# Patient Record
Sex: FEMALE | Race: WHITE | NOT HISPANIC OR LATINO | Employment: UNEMPLOYED | URBAN - METROPOLITAN AREA
[De-identification: names, ages, dates, MRNs, and addresses within clinical notes are randomized per-mention and may not be internally consistent; named-entity substitution may affect disease eponyms.]

---

## 2020-06-18 ENCOUNTER — APPOINTMENT (OUTPATIENT)
Dept: RADIOLOGY | Facility: MEDICAL CENTER | Age: 25
End: 2020-06-18
Attending: EMERGENCY MEDICINE
Payer: MEDICAID

## 2020-06-18 ENCOUNTER — HOSPITAL ENCOUNTER (EMERGENCY)
Facility: MEDICAL CENTER | Age: 25
End: 2020-06-18
Attending: EMERGENCY MEDICINE
Payer: MEDICAID

## 2020-06-18 VITALS
WEIGHT: 160.94 LBS | DIASTOLIC BLOOD PRESSURE: 85 MMHG | TEMPERATURE: 98 F | OXYGEN SATURATION: 98 % | SYSTOLIC BLOOD PRESSURE: 122 MMHG | BODY MASS INDEX: 25.26 KG/M2 | HEART RATE: 70 BPM | RESPIRATION RATE: 16 BRPM | HEIGHT: 67 IN

## 2020-06-18 DIAGNOSIS — S83.402A SPRAIN OF COLLATERAL LIGAMENT OF LEFT KNEE, INITIAL ENCOUNTER: ICD-10-CM

## 2020-06-18 PROCEDURE — 73562 X-RAY EXAM OF KNEE 3: CPT | Mod: LT

## 2020-06-18 PROCEDURE — A9270 NON-COVERED ITEM OR SERVICE: HCPCS | Performed by: EMERGENCY MEDICINE

## 2020-06-18 PROCEDURE — 700102 HCHG RX REV CODE 250 W/ 637 OVERRIDE(OP): Performed by: EMERGENCY MEDICINE

## 2020-06-18 PROCEDURE — 99284 EMERGENCY DEPT VISIT MOD MDM: CPT

## 2020-06-18 RX ORDER — HYDROCODONE BITARTRATE AND ACETAMINOPHEN 5; 325 MG/1; MG/1
2 TABLET ORAL ONCE
Status: COMPLETED | OUTPATIENT
Start: 2020-06-18 | End: 2020-06-18

## 2020-06-18 RX ORDER — IBUPROFEN 800 MG/1
800 TABLET ORAL EVERY 8 HOURS PRN
Qty: 30 TAB | Refills: 0 | Status: SHIPPED | OUTPATIENT
Start: 2020-06-18 | End: 2022-07-05

## 2020-06-18 RX ADMIN — HYDROCODONE BITARTRATE AND ACETAMINOPHEN 2 TABLET: 5; 325 TABLET ORAL at 21:16

## 2020-06-19 NOTE — ED PROVIDER NOTES
ED Provider Note    CHIEF COMPLAINT  Chief Complaint   Patient presents with   • Knee Pain     L Knee, medial.        HPI  Sara Jovel is a 24 y.o. female who presents for evaluation of acute left knee pain.  The patient reports that several weeks ago the medial aspect of her left knee started hurting.  She cannot distinctly recall any lifting twisting or direct blunt or penetrating trauma.  She has had intermittent joint problems due to softball injuries in the past.  She denies any calf swelling fevers chills.  No other symptoms reported.  Patient reports 8 out of 10 pain with extension and ambulation    REVIEW OF SYSTEMS  See HPI for further details.  No fevers chills rash s all other systems are negative.     PAST MEDICAL HISTORY  Past Medical History:   Diagnosis Date   • Anxiety        FAMILY HISTORY  Noncontributory  SOCIAL HISTORY  Social History     Socioeconomic History   • Marital status: Single     Spouse name: Not on file   • Number of children: Not on file   • Years of education: Not on file   • Highest education level: Not on file   Occupational History   • Not on file   Social Needs   • Financial resource strain: Not on file   • Food insecurity     Worry: Not on file     Inability: Not on file   • Transportation needs     Medical: Not on file     Non-medical: Not on file   Tobacco Use   • Smoking status: Never Smoker   • Smokeless tobacco: Never Used   Substance and Sexual Activity   • Alcohol use: No   • Drug use: No   • Sexual activity: Not on file   Lifestyle   • Physical activity     Days per week: Not on file     Minutes per session: Not on file   • Stress: Not on file   Relationships   • Social connections     Talks on phone: Not on file     Gets together: Not on file     Attends [employer] service: Not on file     Active member of club or organization: Not on file     Attends meetings of clubs or organizations: Not on file     Relationship status: Not on file   • Intimate partner violence     " Fear of current or ex partner: Not on file     Emotionally abused: Not on file     Physically abused: Not on file     Forced sexual activity: Not on file   Other Topics Concern   • Not on file   Social History Narrative   • Not on file     Denies IV drugs  SURGICAL HISTORY  Past Surgical History:   Procedure Laterality Date   • OTHER ORTHOPEDIC SURGERY      BL wrist fx; BL ankle fx   • TONSILLECTOMY         CURRENT MEDICATIONS  Home Medications     Reviewed by Schuyler B Collett, R.N. (Registered Nurse) on 06/18/20 at 1859  Med List Status: <None>   Medication Last Dose Status   ALBUTEROL INH  Active   azithromycin (ZITHROMAX) 250 MG TABS  Active   Diazepam (VALIUM PO)  Active   hydrocodone-acetaminophen (VICODIN) 5-500 MG TABS  Active   promethazine (PHENERGAN) 25 MG TABS  Active                ALLERGIES  Allergies   Allergen Reactions   • Nkda [No Known Drug Allergy]        PHYSICAL EXAM  VITAL SIGNS: /83   Pulse 66   Temp 36.8 °C (98.3 °F) (Temporal)   Resp 18   Ht 1.702 m (5' 7\")   Wt 73 kg (160 lb 15 oz)   SpO2 99%   BMI 25.21 kg/m²       Constitutional: Well developed, Well nourished, No acute distress, Non-toxic appearance.   HENT: Normocephalic, Atraumatic, Bilateral external ears normal, Oropharynx moist, No oral exudates, Nose normal.   Eyes: PERRLA, EOMI, Conjunctiva normal, No discharge.   Neck: Normal range of motion, No tenderness, Supple, No stridor.   Cardiovascular: Normal heart rate, Normal rhythm, No murmurs, No rubs, No gallops.   Thorax & Lungs: Normal breath sounds, No respiratory distress, No wheezing, No chest tenderness.   Abdomen: Bowel sounds normal, Soft, No tenderness, No masses, No pulsatile masses.   Skin: Warm, Dry, No erythema, No rash.   Back: No tenderness, No CVA tenderness.   Extremities: There is bony tenderness and discomfort on the medial aspect of the knee without effusion.  Patella is normal.  No erythema or warmth   neurologic: Alert & oriented x 3, Normal " motor function, Normal sensory function, No focal deficits noted.   Psychiatric: Anxious    DX-KNEE 3 VIEWS LEFT   Final Result         1.  No acute traumatic bony injury.            COURSE & MEDICAL DECISION MAKING  Pertinent Labs & Imaging studies reviewed. (See chart for details)  Patient likely has an underlying medial meniscal injury based on her history and physical exam.  We will place her on a knee immobilizer and crutches.  I will prescribe high-dose NSAIDs and refer her for outpatient follow-up with orthopedics with Dr. Jean    FINAL IMPRESSION  1.  Left knee sprain         Electronically signed by: Rome Salamanca M.D., 6/18/2020 7:38 PM

## 2020-06-19 NOTE — ED NOTES
Pt discharged home. Explained discharge and medication instructions. Questions and comments addressed. Pt verbalized understanding of instructions. Pt advised to follow-up with Orthopaedics or return to ED for any new or worsening of symptoms. Pt is ambulating well and steady on feet with use of crutchees. VS stable. Pt advise no driving after taking narcotic medications, pt's spouse in ED lobby and will be driving pt home.

## 2020-06-19 NOTE — ED NOTES
ED tech at bedside now for application of immobilzer to LEFT knee. Pt given crutches fit to height and instructed on proper use, pt verbalized understanding.

## 2020-06-19 NOTE — ED TRIAGE NOTES
Sara Jovel presents to triage, wearing a mask, reporting:  Chief Complaint   Patient presents with   • Knee Pain     L Knee, medial.      Unable to sleep, function. Able to partially weight bear.    Pt denies any respiratory or flu like symptoms at this time. Pt denies any recent travel outside the region. Pt denies exposure to COVID positive individuals.   Pt speaking in full sentences, NAD noted. Pt educated of triage process, placed back in waiting area pending room assignment.

## 2021-10-01 ENCOUNTER — HOSPITAL ENCOUNTER (EMERGENCY)
Facility: MEDICAL CENTER | Age: 26
End: 2021-10-01
Attending: EMERGENCY MEDICINE
Payer: MEDICAID

## 2021-10-01 ENCOUNTER — APPOINTMENT (OUTPATIENT)
Dept: RADIOLOGY | Facility: MEDICAL CENTER | Age: 26
End: 2021-10-01
Attending: EMERGENCY MEDICINE
Payer: MEDICAID

## 2021-10-01 ENCOUNTER — APPOINTMENT (OUTPATIENT)
Dept: URGENT CARE | Facility: CLINIC | Age: 26
End: 2021-10-01
Payer: MEDICAID

## 2021-10-01 VITALS
SYSTOLIC BLOOD PRESSURE: 106 MMHG | RESPIRATION RATE: 18 BRPM | TEMPERATURE: 99 F | HEART RATE: 67 BPM | DIASTOLIC BLOOD PRESSURE: 74 MMHG | WEIGHT: 150.35 LBS | HEIGHT: 67 IN | OXYGEN SATURATION: 97 % | BODY MASS INDEX: 23.6 KG/M2

## 2021-10-01 DIAGNOSIS — R10.32 LEFT LOWER QUADRANT ABDOMINAL PAIN: ICD-10-CM

## 2021-10-01 DIAGNOSIS — N83.202 CYST OF LEFT OVARY: ICD-10-CM

## 2021-10-01 LAB
ALBUMIN SERPL BCP-MCNC: 4.1 G/DL (ref 3.2–4.9)
ALBUMIN/GLOB SERPL: 1.3 G/DL
ALP SERPL-CCNC: 72 U/L (ref 30–99)
ALT SERPL-CCNC: 21 U/L (ref 2–50)
ANION GAP SERPL CALC-SCNC: 10 MMOL/L (ref 7–16)
APPEARANCE UR: ABNORMAL
AST SERPL-CCNC: 25 U/L (ref 12–45)
BACTERIA #/AREA URNS HPF: ABNORMAL /HPF
BASOPHILS # BLD AUTO: 0.7 % (ref 0–1.8)
BASOPHILS # BLD: 0.07 K/UL (ref 0–0.12)
BILIRUB SERPL-MCNC: 0.3 MG/DL (ref 0.1–1.5)
BILIRUB UR QL STRIP.AUTO: NEGATIVE
BUN SERPL-MCNC: 11 MG/DL (ref 8–22)
CALCIUM SERPL-MCNC: 9.1 MG/DL (ref 8.5–10.5)
CHLORIDE SERPL-SCNC: 104 MMOL/L (ref 96–112)
CO2 SERPL-SCNC: 23 MMOL/L (ref 20–33)
COLOR UR: YELLOW
CREAT SERPL-MCNC: 0.88 MG/DL (ref 0.5–1.4)
EOSINOPHIL # BLD AUTO: 0.55 K/UL (ref 0–0.51)
EOSINOPHIL NFR BLD: 5.7 % (ref 0–6.9)
EPI CELLS #/AREA URNS HPF: ABNORMAL /HPF
ERYTHROCYTE [DISTWIDTH] IN BLOOD BY AUTOMATED COUNT: 50.2 FL (ref 35.9–50)
GLOBULIN SER CALC-MCNC: 3.2 G/DL (ref 1.9–3.5)
GLUCOSE SERPL-MCNC: 74 MG/DL (ref 65–99)
GLUCOSE UR STRIP.AUTO-MCNC: NEGATIVE MG/DL
HCG SERPL QL: NEGATIVE
HCT VFR BLD AUTO: 41.4 % (ref 37–47)
HGB BLD-MCNC: 13.3 G/DL (ref 12–16)
HYALINE CASTS #/AREA URNS LPF: ABNORMAL /LPF
IMM GRANULOCYTES # BLD AUTO: 0.04 K/UL (ref 0–0.11)
IMM GRANULOCYTES NFR BLD AUTO: 0.4 % (ref 0–0.9)
KETONES UR STRIP.AUTO-MCNC: NEGATIVE MG/DL
LEUKOCYTE ESTERASE UR QL STRIP.AUTO: ABNORMAL
LIPASE SERPL-CCNC: 30 U/L (ref 11–82)
LYMPHOCYTES # BLD AUTO: 2.71 K/UL (ref 1–4.8)
LYMPHOCYTES NFR BLD: 27.9 % (ref 22–41)
MCH RBC QN AUTO: 26.1 PG (ref 27–33)
MCHC RBC AUTO-ENTMCNC: 32.1 G/DL (ref 33.6–35)
MCV RBC AUTO: 81.2 FL (ref 81.4–97.8)
MICRO URNS: ABNORMAL
MONOCYTES # BLD AUTO: 0.73 K/UL (ref 0–0.85)
MONOCYTES NFR BLD AUTO: 7.5 % (ref 0–13.4)
NEUTROPHILS # BLD AUTO: 5.62 K/UL (ref 2–7.15)
NEUTROPHILS NFR BLD: 57.8 % (ref 44–72)
NITRITE UR QL STRIP.AUTO: NEGATIVE
NRBC # BLD AUTO: 0 K/UL
NRBC BLD-RTO: 0 /100 WBC
PH UR STRIP.AUTO: 7 [PH] (ref 5–8)
PLATELET # BLD AUTO: 243 K/UL (ref 164–446)
PMV BLD AUTO: 9.5 FL (ref 9–12.9)
POTASSIUM SERPL-SCNC: 4.1 MMOL/L (ref 3.6–5.5)
PROT SERPL-MCNC: 7.3 G/DL (ref 6–8.2)
PROT UR QL STRIP: NEGATIVE MG/DL
RBC # BLD AUTO: 5.1 M/UL (ref 4.2–5.4)
RBC # URNS HPF: ABNORMAL /HPF
RBC UR QL AUTO: NEGATIVE
SODIUM SERPL-SCNC: 137 MMOL/L (ref 135–145)
SP GR UR STRIP.AUTO: 1.02
UROBILINOGEN UR STRIP.AUTO-MCNC: 0.2 MG/DL
WBC # BLD AUTO: 9.7 K/UL (ref 4.8–10.8)
WBC #/AREA URNS HPF: ABNORMAL /HPF

## 2021-10-01 PROCEDURE — 87086 URINE CULTURE/COLONY COUNT: CPT

## 2021-10-01 PROCEDURE — 81001 URINALYSIS AUTO W/SCOPE: CPT

## 2021-10-01 PROCEDURE — 83690 ASSAY OF LIPASE: CPT

## 2021-10-01 PROCEDURE — 84703 CHORIONIC GONADOTROPIN ASSAY: CPT

## 2021-10-01 PROCEDURE — 76856 US EXAM PELVIC COMPLETE: CPT

## 2021-10-01 PROCEDURE — 700102 HCHG RX REV CODE 250 W/ 637 OVERRIDE(OP): Performed by: EMERGENCY MEDICINE

## 2021-10-01 PROCEDURE — 99285 EMERGENCY DEPT VISIT HI MDM: CPT | Mod: EDC

## 2021-10-01 PROCEDURE — A9270 NON-COVERED ITEM OR SERVICE: HCPCS | Performed by: EMERGENCY MEDICINE

## 2021-10-01 PROCEDURE — 96374 THER/PROPH/DIAG INJ IV PUSH: CPT | Mod: EDC,XU

## 2021-10-01 PROCEDURE — 74177 CT ABD & PELVIS W/CONTRAST: CPT

## 2021-10-01 PROCEDURE — 96375 TX/PRO/DX INJ NEW DRUG ADDON: CPT | Mod: EDC

## 2021-10-01 PROCEDURE — 85025 COMPLETE CBC W/AUTO DIFF WBC: CPT

## 2021-10-01 PROCEDURE — 80053 COMPREHEN METABOLIC PANEL: CPT

## 2021-10-01 PROCEDURE — 700117 HCHG RX CONTRAST REV CODE 255: Performed by: EMERGENCY MEDICINE

## 2021-10-01 PROCEDURE — 700111 HCHG RX REV CODE 636 W/ 250 OVERRIDE (IP): Performed by: EMERGENCY MEDICINE

## 2021-10-01 RX ORDER — KETOROLAC TROMETHAMINE 30 MG/ML
15 INJECTION, SOLUTION INTRAMUSCULAR; INTRAVENOUS ONCE
Status: COMPLETED | OUTPATIENT
Start: 2021-10-01 | End: 2021-10-01

## 2021-10-01 RX ORDER — HYDROCODONE BITARTRATE AND ACETAMINOPHEN 5; 325 MG/1; MG/1
1 TABLET ORAL ONCE
Status: COMPLETED | OUTPATIENT
Start: 2021-10-01 | End: 2021-10-01

## 2021-10-01 RX ORDER — MORPHINE SULFATE 4 MG/ML
4 INJECTION, SOLUTION INTRAMUSCULAR; INTRAVENOUS ONCE
Status: COMPLETED | OUTPATIENT
Start: 2021-10-01 | End: 2021-10-01

## 2021-10-01 RX ADMIN — MORPHINE SULFATE 4 MG: 4 INJECTION INTRAVENOUS at 17:23

## 2021-10-01 RX ADMIN — KETOROLAC TROMETHAMINE 15 MG: 30 INJECTION, SOLUTION INTRAMUSCULAR at 18:08

## 2021-10-01 RX ADMIN — HYDROCODONE BITARTRATE AND ACETAMINOPHEN 1 TABLET: 5; 325 TABLET ORAL at 19:42

## 2021-10-01 RX ADMIN — IOHEXOL 80 ML: 350 INJECTION, SOLUTION INTRAVENOUS at 17:36

## 2021-10-01 ASSESSMENT — PAIN DESCRIPTION - PAIN TYPE: TYPE: ACUTE PAIN

## 2021-10-01 NOTE — ED NOTES
First interaction with patient. Pt ambulatory to room. Assessment completed.   Reviewed and agree with triage note.     Instructed pt on call light and NPO status. Chart up for ERP.

## 2021-10-01 NOTE — ED TRIAGE NOTES
27 y/o female ambulatory to triage with c/o LLQ abd pain x 10 days. Pt states she was seen at [facility] and told to come to the ED for further evaluation. Pt denies n/v/d, no dysuria reported. Pt LMP was 8/19 which is not usual for her, pt reports negative pregnancy test at .

## 2021-10-01 NOTE — ED PROVIDER NOTES
"ED Provider Note    CHIEF COMPLAINT  Chief Complaint   Patient presents with   • LLQ Pain     x 10 days       HPI  Sara Jovel is a 26 y.o. female who presents with LLQ abdominal pain for about 10 days.  She awoke with the symptoms.  No vomiting or diarrhea. No fevers.  No dysuria.  No vaginal bleeding or discharge. Last menstrual cycle was about 5 weeks ago that is not atypical for her.  No chest pain, shortness of breath, cough.      She has a prior hx of appendectomy.  No gyn surgery in the past.  Has chronic back pain.  No known family history of inflammatory bowel disease or other known GI issues.  No prior hx of colonoscopy.      REVIEW OF SYSTEMS  See HPI for further details. All other systems are negative.     PAST MEDICAL HISTORY   has a past medical history of Anxiety.    SOCIAL HISTORY  Social History     Tobacco Use   • Smoking status: Never Smoker   • Smokeless tobacco: Never Used   Vaping Use   • Vaping Use: Every day   • Substances: Nicotine   Substance and Sexual Activity   • Alcohol use: Yes     Comment: socially   • Drug use: Yes     Types: Inhaled     Comment: marijuana   • Sexual activity: Not on file       SURGICAL HISTORY   has a past surgical history that includes tonsillectomy and appendectomy.    CURRENT MEDICATIONS  Home Medications     Reviewed by Bossman Llamas R.N. (Registered Nurse) on 10/01/21 at 1524  Med List Status: Partial   Medication Last Dose Status   ALBUTEROL INH  Active   azithromycin (ZITHROMAX) 250 MG TABS  Active   Diazepam (VALIUM PO)  Active   hydrocodone-acetaminophen (VICODIN) 5-500 MG TABS  Active   ibuprofen (MOTRIN) 800 MG Tab  Active   promethazine (PHENERGAN) 25 MG TABS  Active                ALLERGIES  Allergies   Allergen Reactions   • Nkda [No Known Drug Allergy]        PHYSICAL EXAM  VITAL SIGNS: /83   Pulse 68   Temp 37.1 °C (98.8 °F) (Temporal)   Resp 16   Ht 1.702 m (5' 7\")   Wt 68.2 kg (150 lb 5.7 oz)   LMP 08/19/2021   SpO2 100%   BMI " 23.55 kg/m²   Pulse ox interpretation: I interpret this pulse ox as normal.  Constitutional: Alert in no apparent distress.  HENT: No signs of trauma, Bilateral external ears normal, Nose normal.   Eyes: Conjunctiva normal, Non-icteric.   Neck:  No tenderness, Supple, No stridor.   Cardiovascular: Regular rate and rhythm.   Thorax & Lungs: Normal breath sounds, No respiratory distress  Abdomen: Bowel sounds normal, Soft, LLQ tenderness, No masses, No pulsatile masses. No peritoneal signs.  Skin: Warm, Dry, No erythema, No rash.   Extremities: Intact distal pulses, No edema, No cyanosis  Musculoskeletal: Good range of motion in all major joints. No major deformities noted.   Neurologic: Alert, No focal deficits noted.       DIAGNOSTIC STUDIES / PROCEDURES      LABS  Labs Reviewed   CBC WITH DIFFERENTIAL - Abnormal; Notable for the following components:       Result Value    MCV 81.2 (*)     MCH 26.1 (*)     MCHC 32.1 (*)     RDW 50.2 (*)     Eos (Absolute) 0.55 (*)     All other components within normal limits   URINALYSIS,CULTURE IF INDICATED - Abnormal; Notable for the following components:    Character Cloudy (*)     Leukocyte Esterase Small (*)     All other components within normal limits    Narrative:     Indication for culture:->Patient WITHOUT an indwelling Jordan  catheter in place with new onset of Dysuria, Frequency,  Urgency, and/or Suprapubic pain   URINE MICROSCOPIC (W/UA) - Abnormal; Notable for the following components:    WBC 10-20 (*)     Bacteria Few (*)     Epithelial Cells Moderate (*)     Hyaline Cast 3-5 (*)     All other components within normal limits    Narrative:     Indication for culture:->Patient WITHOUT an indwelling Jordan  catheter in place with new onset of Dysuria, Frequency,  Urgency, and/or Suprapubic pain   COMP METABOLIC PANEL   LIPASE   HCG QUAL SERUM   URINE CULTURE(NEW)    Narrative:     Indication for culture:->Patient WITHOUT an indwelling Jordan  catheter in place with new  onset of Dysuria, Frequency,  Urgency, and/or Suprapubic pain         RADIOLOGY  US-PELVIC COMPLETE (TRANSABDOMINAL/TRANSVAGINAL) (COMBO)   Final Result      1.  Endometrial complex thickening. Correlation with menstrual status is recommended.      2.  Small amount of free fluid in the cul-de-sac.      3.  Otherwise negative pelvic ultrasound.      CT-ABDOMEN-PELVIS WITH   Final Result      1.  There is no acute inflammatory process in the abdomen or pelvis.   2.  There is no bowel obstruction.   3.  There is a probable crenated resolving cyst in the left hemipelvis.   4.  There is an incidental 4 mm left lower lobe lung nodule, statistically most likely postinflammatory in a patient of this age. This patient is below the age limit for utilizing Fleischner criteria. No imaging follow-up is recommended in this age    group.            COURSE & MEDICAL DECISION MAKING    Medications   morphine (pf) 4 mg/mL injection 4 mg (4 mg Intravenous Given 10/1/21 1723)   iohexol (OMNIPAQUE) 350 mg/mL (80 mL Intravenous Given 10/1/21 1736)   ketorolac (TORADOL) injection 15 mg (15 mg Intravenous Given 10/1/21 1808)   HYDROcodone-acetaminophen (NORCO) 5-325 MG per tablet 1 Tablet (1 Tablet Oral Given 10/1/21 1942)       Pertinent Labs & Imaging studies reviewed. (See chart for details)  26 y.o. female presenting with left lower quadrant abdominal pain. Has been ongoing for about 10 days. No vaginal bleeding or discharge. No fevers. She states that her last menstrual cycle was a little bit over a month ago which is not completely out of the ordinary for her. Has a prior history of appendectomy. She does have left lower quadrant abdominal pain on physical examination. No GI symptoms such as bloody stool or black stool or diarrhea. No prior history of colonoscopy.    CT abdomen pelvis was ordered for further evaluation of patient's symptoms. No evidence of diverticulitis. She was found to have a commuted resolving cyst to the left  "hemipelvis.    Patient was informed regarding the results. Continues to have pain despite IV analgesia. Ultrasound of pelvis was unremarkable. No evidence of ovarian torsion.    Pain may be related to ruptured cyst or possibly related to menstrual cycle pain since she is slightly delayed. She is not currently pregnant. Does not appear to have an acute inflammatory process or infectious etiology. Recommending supportive care measures at home such as acetaminophen or ibuprofen. Patient was ultimately discharged home in stable condition.    The patient was instructed to follow-up with primary care physician for further management.  To return immediately for any worsening symptoms or development of any other concerning signs or symptoms. The patient verbalizes understanding in their own words.    /74   Pulse 67   Temp 37.2 °C (99 °F) (Temporal)   Resp 18   Ht 1.702 m (5' 7\")   Wt 68.2 kg (150 lb 5.7 oz)   LMP 08/19/2021   SpO2 97%   BMI 23.55 kg/m²     The patient was referred to primary care where they will receive further BP management.      Rito Mesa M.D.  [address] [place] [id number]  [phone]    Schedule an appointment as soon as possible for a visit       [facility], Emergency Dept  [address] [place] [id number] [phone]    As needed, If symptoms worsen      FINAL IMPRESSION  1. Left lower quadrant abdominal pain    2. Cyst of left ovary            Electronically signed by: Minh Snider M.D., 10/1/2021 4:29 PM    "

## 2021-10-02 NOTE — ED NOTES
"Sara Jovel has been discharged from the Emergency Room.    Discharge instructions, which include signs and symptoms to monitor patient for, hydration and hand hygiene importance, as well as detailed information regarding LLQ abdominal pain, cyst of L ovary provided. This RN also encouraged a follow-up appointment to be made with patient's PCP. All questions and concerns addressed at this time.      Patient leaves ER in no apparent distress, is awake, alert, and interactive. Patient is aware of the need to return to the ER for any concerns or changes in current condition.    /74   Pulse 67   Temp 37.2 °C (99 °F) (Temporal)   Resp 18   Ht 1.702 m (5' 7\")   Wt 68.2 kg (150 lb 5.7 oz)   LMP 08/19/2021   SpO2 97%   BMI 23.55 kg/m²         "

## 2021-10-03 LAB
BACTERIA UR CULT: NORMAL
SIGNIFICANT IND 70042: NORMAL
SITE SITE: NORMAL
SOURCE SOURCE: NORMAL

## 2022-03-17 ENCOUNTER — NON-PROVIDER VISIT (OUTPATIENT)
Dept: URGENT CARE | Facility: CLINIC | Age: 27
End: 2022-03-17

## 2022-03-17 DIAGNOSIS — Z11.1 PPD SCREENING TEST: ICD-10-CM

## 2022-03-17 PROCEDURE — 86580 TB INTRADERMAL TEST: CPT | Performed by: PHYSICIAN ASSISTANT

## 2022-03-17 PROCEDURE — 99999 PR NO CHARGE: CPT | Performed by: PHYSICIAN ASSISTANT

## 2022-03-17 NOTE — NON-PROVIDER
Sara Jovel is a 26 y.o. female here for a non-provider visit for PPD placement -- Step 1 of 1    Reason for PPD:  work requirement    1. TB evaluation questionnaire completed by patient? Yes      -  If any answers marked yes did you contact a provider prior to placing? Not Indicated  2.  Patient notified to return to clinic for reading on: Saturday, 03/19/22 after 0900 or Sunday, 03/20/22 before 0900.  3.  PPD Placement documentation completed on TB evaluation questionnaire? Yes  4.  Location of TB evaluation questionnaire filed: .

## 2022-03-19 ENCOUNTER — NON-PROVIDER VISIT (OUTPATIENT)
Dept: URGENT CARE | Facility: CLINIC | Age: 27
End: 2022-03-19

## 2022-03-19 LAB — TB WHEAL 3D P 5 TU DIAM: NORMAL MM

## 2022-03-19 PROCEDURE — 99999 PR NO CHARGE: CPT | Performed by: NURSE PRACTITIONER

## 2022-05-11 ENCOUNTER — APPOINTMENT (OUTPATIENT)
Dept: RADIOLOGY | Facility: MEDICAL CENTER | Age: 27
End: 2022-05-11
Attending: EMERGENCY MEDICINE
Payer: MEDICAID

## 2022-05-11 ENCOUNTER — HOSPITAL ENCOUNTER (EMERGENCY)
Facility: MEDICAL CENTER | Age: 27
End: 2022-05-11
Attending: EMERGENCY MEDICINE
Payer: MEDICAID

## 2022-05-11 VITALS
HEIGHT: 67 IN | OXYGEN SATURATION: 94 % | WEIGHT: 207.89 LBS | HEART RATE: 82 BPM | SYSTOLIC BLOOD PRESSURE: 106 MMHG | TEMPERATURE: 97.9 F | DIASTOLIC BLOOD PRESSURE: 61 MMHG | BODY MASS INDEX: 32.63 KG/M2 | RESPIRATION RATE: 16 BRPM

## 2022-05-11 DIAGNOSIS — G43.109 MIGRAINE WITH AURA AND WITHOUT STATUS MIGRAINOSUS, NOT INTRACTABLE: ICD-10-CM

## 2022-05-11 LAB
ALBUMIN SERPL BCP-MCNC: 3.8 G/DL (ref 3.2–4.9)
ALBUMIN/GLOB SERPL: 1.5 G/DL
ALP SERPL-CCNC: 71 U/L (ref 30–99)
ALT SERPL-CCNC: 14 U/L (ref 2–50)
ANION GAP SERPL CALC-SCNC: 12 MMOL/L (ref 7–16)
AST SERPL-CCNC: 19 U/L (ref 12–45)
BASOPHILS # BLD AUTO: 1 % (ref 0–1.8)
BASOPHILS # BLD: 0.07 K/UL (ref 0–0.12)
BILIRUB SERPL-MCNC: <0.2 MG/DL (ref 0.1–1.5)
BUN SERPL-MCNC: 9 MG/DL (ref 8–22)
CALCIUM SERPL-MCNC: 8.7 MG/DL (ref 8.5–10.5)
CHLORIDE SERPL-SCNC: 108 MMOL/L (ref 96–112)
CO2 SERPL-SCNC: 20 MMOL/L (ref 20–33)
CREAT SERPL-MCNC: 0.65 MG/DL (ref 0.5–1.4)
EOSINOPHIL # BLD AUTO: 0.62 K/UL (ref 0–0.51)
EOSINOPHIL NFR BLD: 8.4 % (ref 0–6.9)
ERYTHROCYTE [DISTWIDTH] IN BLOOD BY AUTOMATED COUNT: 45.4 FL (ref 35.9–50)
GFR SERPLBLD CREATININE-BSD FMLA CKD-EPI: 125 ML/MIN/1.73 M 2
GLOBULIN SER CALC-MCNC: 2.5 G/DL (ref 1.9–3.5)
GLUCOSE SERPL-MCNC: 84 MG/DL (ref 65–99)
HCG SERPL QL: NEGATIVE
HCT VFR BLD AUTO: 38.4 % (ref 37–47)
HGB BLD-MCNC: 12.5 G/DL (ref 12–16)
IMM GRANULOCYTES # BLD AUTO: 0.05 K/UL (ref 0–0.11)
IMM GRANULOCYTES NFR BLD AUTO: 0.7 % (ref 0–0.9)
LYMPHOCYTES # BLD AUTO: 2.31 K/UL (ref 1–4.8)
LYMPHOCYTES NFR BLD: 31.5 % (ref 22–41)
MCH RBC QN AUTO: 27.2 PG (ref 27–33)
MCHC RBC AUTO-ENTMCNC: 32.6 G/DL (ref 33.6–35)
MCV RBC AUTO: 83.5 FL (ref 81.4–97.8)
MONOCYTES # BLD AUTO: 0.44 K/UL (ref 0–0.85)
MONOCYTES NFR BLD AUTO: 6 % (ref 0–13.4)
NEUTROPHILS # BLD AUTO: 3.85 K/UL (ref 2–7.15)
NEUTROPHILS NFR BLD: 52.4 % (ref 44–72)
NRBC # BLD AUTO: 0 K/UL
NRBC BLD-RTO: 0 /100 WBC
PLATELET # BLD AUTO: 271 K/UL (ref 164–446)
PMV BLD AUTO: 9 FL (ref 9–12.9)
POTASSIUM SERPL-SCNC: 3.9 MMOL/L (ref 3.6–5.5)
PROT SERPL-MCNC: 6.3 G/DL (ref 6–8.2)
RBC # BLD AUTO: 4.6 M/UL (ref 4.2–5.4)
SODIUM SERPL-SCNC: 140 MMOL/L (ref 135–145)
WBC # BLD AUTO: 7.3 K/UL (ref 4.8–10.8)

## 2022-05-11 PROCEDURE — 96375 TX/PRO/DX INJ NEW DRUG ADDON: CPT

## 2022-05-11 PROCEDURE — 99284 EMERGENCY DEPT VISIT MOD MDM: CPT

## 2022-05-11 PROCEDURE — 84703 CHORIONIC GONADOTROPIN ASSAY: CPT

## 2022-05-11 PROCEDURE — 36415 COLL VENOUS BLD VENIPUNCTURE: CPT

## 2022-05-11 PROCEDURE — 700102 HCHG RX REV CODE 250 W/ 637 OVERRIDE(OP): Performed by: EMERGENCY MEDICINE

## 2022-05-11 PROCEDURE — 85025 COMPLETE CBC W/AUTO DIFF WBC: CPT

## 2022-05-11 PROCEDURE — A9270 NON-COVERED ITEM OR SERVICE: HCPCS | Performed by: EMERGENCY MEDICINE

## 2022-05-11 PROCEDURE — 96374 THER/PROPH/DIAG INJ IV PUSH: CPT

## 2022-05-11 PROCEDURE — 80053 COMPREHEN METABOLIC PANEL: CPT

## 2022-05-11 PROCEDURE — 70450 CT HEAD/BRAIN W/O DYE: CPT

## 2022-05-11 PROCEDURE — 700105 HCHG RX REV CODE 258: Performed by: EMERGENCY MEDICINE

## 2022-05-11 PROCEDURE — 700111 HCHG RX REV CODE 636 W/ 250 OVERRIDE (IP): Performed by: EMERGENCY MEDICINE

## 2022-05-11 RX ORDER — PROCHLORPERAZINE EDISYLATE 5 MG/ML
10 INJECTION INTRAMUSCULAR; INTRAVENOUS ONCE
Status: COMPLETED | OUTPATIENT
Start: 2022-05-11 | End: 2022-05-11

## 2022-05-11 RX ORDER — ACETAMINOPHEN 325 MG/1
650 TABLET ORAL ONCE
Status: COMPLETED | OUTPATIENT
Start: 2022-05-11 | End: 2022-05-11

## 2022-05-11 RX ORDER — MORPHINE SULFATE 4 MG/ML
4 INJECTION INTRAVENOUS ONCE
Status: COMPLETED | OUTPATIENT
Start: 2022-05-11 | End: 2022-05-11

## 2022-05-11 RX ORDER — SODIUM CHLORIDE, SODIUM LACTATE, POTASSIUM CHLORIDE, CALCIUM CHLORIDE 600; 310; 30; 20 MG/100ML; MG/100ML; MG/100ML; MG/100ML
1000 INJECTION, SOLUTION INTRAVENOUS ONCE
Status: COMPLETED | OUTPATIENT
Start: 2022-05-11 | End: 2022-05-11

## 2022-05-11 RX ORDER — ONDANSETRON 2 MG/ML
4 INJECTION INTRAMUSCULAR; INTRAVENOUS ONCE
Status: COMPLETED | OUTPATIENT
Start: 2022-05-11 | End: 2022-05-11

## 2022-05-11 RX ORDER — DEXAMETHASONE SODIUM PHOSPHATE 4 MG/ML
10 INJECTION, SOLUTION INTRA-ARTICULAR; INTRALESIONAL; INTRAMUSCULAR; INTRAVENOUS; SOFT TISSUE ONCE
Status: COMPLETED | OUTPATIENT
Start: 2022-05-11 | End: 2022-05-11

## 2022-05-11 RX ADMIN — ONDANSETRON 4 MG: 2 INJECTION INTRAMUSCULAR; INTRAVENOUS at 15:31

## 2022-05-11 RX ADMIN — DEXAMETHASONE SODIUM PHOSPHATE 10 MG: 4 INJECTION, SOLUTION INTRA-ARTICULAR; INTRALESIONAL; INTRAMUSCULAR; INTRAVENOUS; SOFT TISSUE at 18:09

## 2022-05-11 RX ADMIN — ACETAMINOPHEN 650 MG: 325 TABLET, FILM COATED ORAL at 15:31

## 2022-05-11 RX ADMIN — MORPHINE SULFATE 4 MG: 4 INJECTION INTRAVENOUS at 16:49

## 2022-05-11 RX ADMIN — PROCHLORPERAZINE EDISYLATE 10 MG: 5 INJECTION INTRAMUSCULAR; INTRAVENOUS at 15:31

## 2022-05-11 RX ADMIN — SODIUM CHLORIDE, POTASSIUM CHLORIDE, SODIUM LACTATE AND CALCIUM CHLORIDE 1000 ML: 600; 310; 30; 20 INJECTION, SOLUTION INTRAVENOUS at 15:31

## 2022-05-11 ASSESSMENT — FIBROSIS 4 INDEX: FIB4 SCORE: 0.56

## 2022-05-11 NOTE — ED TRIAGE NOTES
Pt ambulated to triage with   Chief Complaint   Patient presents with   • Head Ache     H/o migraine, started this morning at 630, blurred vision to left eye, sensitive to right eye to light and sensitive to sound.  H/o similar in past, took 3 of excedrin at home and had shot of toradol and zofran  at  this morning.       Pt Informed regarding triage process and verbalized understanding to inform triage tech or RN for any changes in condition. Placed in lobby.

## 2022-05-11 NOTE — ED PROVIDER NOTES
"ED Provider Note    Scribed for Yovani Akbar M.D. by Fallon Liao. 5/11/2022, 2:39 PM.    Primary care provider: PETE Hdez  Means of arrival: Walk-in  History obtained from: Patient  History limited by: None    CHIEF COMPLAINT  Chief Complaint   Patient presents with   • Head Ache     H/o migraine, started this morning at 630, blurred vision to left eye, sensitive to right eye to light and sensitive to sound.  H/o similar in past, took 3 of excedrin at home and had shot of toradol and zofran  at  this morning.         AUDRA Jovel is a 26 y.o. female who presents to the Emergency Department for a gradually worsening headache onset 6:00 AM. She is additionally experiencing blurred vision in her left eye which she describes as a \"cloudiness\", photophobia in the right eye, neck pain, nausea, vomiting, and difficulty word finding onset 9:00 AM. She took 3 Excedrin this morning with no alleviation. She denies numbness, tingling, slurred speech, cough, fevers, and chills. She has a history of headaches which she believed improved after stopping her birth control. She says this headache and accompanying symptoms are not consistent with headaches she has experienced in the past. She is currently menstruating. She denies typically experiencing headaches during her menstrual cycles. She denies any allergies to medications. She was evaluated at Urgent Care prior to arrival and received Toradol and Zofran.    REVIEW OF SYSTEMS  Pertinent positives include headache, blurred vision, photophobia, neck pain, nausea, vomiting, and difficulty word finding. Pertinent negatives include numbness, tingling, slurred speech, cough, fevers, and chills. All other systems negative.    PAST MEDICAL HISTORY   has a past medical history of Anxiety and Migraine.    SURGICAL HISTORY   has a past surgical history that includes tonsillectomy and appendectomy.    SOCIAL HISTORY  Social History     Tobacco Use   • " "Smoking status: Never Smoker   • Smokeless tobacco: Never Used   Vaping Use   • Vaping Use: Every day   • Substances: Nicotine   Substance Use Topics   • Alcohol use: Yes     Comment: socially   • Drug use: Yes     Types: Inhaled     Comment: marijuana      Social History     Substance and Sexual Activity   Drug Use Yes   • Types: Inhaled    Comment: marijuana       FAMILY HISTORY  History reviewed. No pertinent family history.    CURRENT MEDICATIONS  Home Medications     Reviewed by Porsche Bird R.N. (Registered Nurse) on 05/11/22 at 1335  Med List Status: Partial   Medication Last Dose Status   ALBUTEROL INH  Active   azithromycin (ZITHROMAX) 250 MG TABS  Active   Diazepam (VALIUM PO)  Active   hydrocodone-acetaminophen (VICODIN) 5-500 MG TABS  Active   ibuprofen (MOTRIN) 800 MG Tab  Active   promethazine (PHENERGAN) 25 MG TABS  Active                ALLERGIES  Allergies   Allergen Reactions   • Nkda [No Known Drug Allergy]        PHYSICAL EXAM  VITAL SIGNS: BP (!) 147/87   Pulse 90   Temp 36.7 °C (98 °F) (Temporal)   Resp 16   Ht 1.702 m (5' 7\")   Wt 94.3 kg (207 lb 14.3 oz)   LMP 05/10/2022   SpO2 99%   BMI 32.56 kg/m²     Constitutional: Well developed, Well nourished, in mild distress.   HENT: Normocephalic, Atraumatic, mask in place.  Eyes: Conjunctiva normal, No discharge. No obvious hemorrhage.  Neck: Supple, No stridor, no meningismus.   Cardiovascular: Normal heart rate, Normal rhythm, No murmurs, equal pulses.   Pulmonary: Normal breath sounds, No respiratory distress, No wheezing, No rales, No rhonchi.  Chest: No chest wall tenderness or deformity.   Abdomen:Soft, No tenderness, No masses, no rebound, no guarding.   Back: No CVA tenderness.   Musculoskeletal: No major deformities noted, No tenderness.   Skin: Warm, Dry, No erythema, No rash.   Neurologic: Alert & oriented x 3, Normal motor function, No focal deficits noted. Normal finger to nose, Normal cranial nerves II-XII, No pronator " drift. Equal strength in upper and lower extremities bilaterally.  Psychiatric: Affect normal, Judgment normal, Mood normal.     LABS  Results for orders placed or performed during the hospital encounter of 05/11/22   CBC WITH DIFFERENTIAL   Result Value Ref Range    WBC 7.3 4.8 - 10.8 K/uL    RBC 4.60 4.20 - 5.40 M/uL    Hemoglobin 12.5 12.0 - 16.0 g/dL    Hematocrit 38.4 37.0 - 47.0 %    MCV 83.5 81.4 - 97.8 fL    MCH 27.2 27.0 - 33.0 pg    MCHC 32.6 (L) 33.6 - 35.0 g/dL    RDW 45.4 35.9 - 50.0 fL    Platelet Count 271 164 - 446 K/uL    MPV 9.0 9.0 - 12.9 fL    Neutrophils-Polys 52.40 44.00 - 72.00 %    Lymphocytes 31.50 22.00 - 41.00 %    Monocytes 6.00 0.00 - 13.40 %    Eosinophils 8.40 (H) 0.00 - 6.90 %    Basophils 1.00 0.00 - 1.80 %    Immature Granulocytes 0.70 0.00 - 0.90 %    Nucleated RBC 0.00 /100 WBC    Neutrophils (Absolute) 3.85 2.00 - 7.15 K/uL    Lymphs (Absolute) 2.31 1.00 - 4.80 K/uL    Monos (Absolute) 0.44 0.00 - 0.85 K/uL    Eos (Absolute) 0.62 (H) 0.00 - 0.51 K/uL    Baso (Absolute) 0.07 0.00 - 0.12 K/uL    Immature Granulocytes (abs) 0.05 0.00 - 0.11 K/uL    NRBC (Absolute) 0.00 K/uL   COMP METABOLIC PANEL   Result Value Ref Range    Sodium 140 135 - 145 mmol/L    Potassium 3.9 3.6 - 5.5 mmol/L    Chloride 108 96 - 112 mmol/L    Co2 20 20 - 33 mmol/L    Anion Gap 12.0 7.0 - 16.0    Glucose 84 65 - 99 mg/dL    Bun 9 8 - 22 mg/dL    Creatinine 0.65 0.50 - 1.40 mg/dL    Calcium 8.7 8.5 - 10.5 mg/dL    AST(SGOT) 19 12 - 45 U/L    ALT(SGPT) 14 2 - 50 U/L    Alkaline Phosphatase 71 30 - 99 U/L    Total Bilirubin <0.2 0.1 - 1.5 mg/dL    Albumin 3.8 3.2 - 4.9 g/dL    Total Protein 6.3 6.0 - 8.2 g/dL    Globulin 2.5 1.9 - 3.5 g/dL    A-G Ratio 1.5 g/dL   HCG QUAL SERUM   Result Value Ref Range    Beta-Hcg Qualitative Serum Negative Negative   ESTIMATED GFR   Result Value Ref Range    GFR (CKD-EPI) 125 >60 mL/min/1.73 m 2       All labs reviewed by me.    RADIOLOGY  CT-HEAD W/O   Final Result      No  CT evidence of acute infarct, hemorrhage or mass.           The radiologist's interpretation of all radiological studies have been reviewed by me.    COURSE & MEDICAL DECISION MAKING  Pertinent Labs & Imaging studies reviewed. (See chart for details)    2:39 PM - Patient seen and examined at bedside. Patient will be treated with Compazine 10 mg, Zofran 4 mg, Tylenol 650 mg, and LR infusion (bolus). Ordered CBC with diff, CMP, and HCG Qualitative to evaluate her symptoms. The differential diagnoses include but are not limited to: migraine headache, subarachnoid hemorrhage, tension headache    HYDRATION: Based on the patient's presentation of Inability to take oral fluids the patient was given IV fluids. IV Hydration was used because oral hydration was not adequate alone. Upon recheck following hydration, the patient was improved.    4:37 PM - Patient was reevaluated at bedside. Her headache has not improved. Ordered CT-Head to further evaluate. Treated patient with morphine 4 mg.    5:58 PM - Patient was reevaluated at bedside. Her headache is has almost resolved completley. Discussed lab and radiology results with the patient. I discussed performing a lumbar puncture to rule out hemmhorage and she declined. She understands the risk of permanent disability or death. She is nervous because she has spina bifida.      6:02 PM - Treated patient with Decadron 10 mg prior to discharge.     Medical Decision Making: At this point time I think the patient most likely has a migraine headache causing his vision changes and pain.   initially after treatment with migraine cocktail patient's headache was not significantly improved therefore CT of the head was done to rule out subarachnoid hemorrhage which is negative.  She has no fever, no neck stiffness, no elevation of white blood cell count I do not think the patient has meningitis.  I did offer her lumbar puncture to fully rule out subarachnoid hemorrhage but the patient is  declining this at this point.  She understands the risks including permanent disability and death if we miss a subarachnoid hemorrhage but given the fact that her headache is now improved and that she has spina bifida she does not want to proceed.  We will go ahead and discharge her home to have her follow-up with her primary.     The patient will return for new or worsening symptoms and is stable at the time of discharge.    The patient is referred to a primary physician for blood pressure management, diabetic screening, and for all other preventative health concerns.         DISPOSITION:  Patient will be discharged home in stable condition.    FOLLOW UP:  SHAMIR HdezRMarlyN.  [address] [address]  [phone]    Schedule an appointment as soon as possible for a visit in 1 week        OUTPATIENT MEDICATIONS:  Discharge Medication List as of 5/11/2022  6:03 PM           FINAL IMPRESSION  1. Migraine with aura and without status migrainosus, not intractable          Fallon KELLY (Sheryl), am scribing for, and in the presence of, Yovani Akbar M.D.    Electronically signed by: Fallon Liao (Sheryl), 5/11/2022    Yovani KELLY M.D. personally performed the services described in this documentation, as scribed by Fallon Liao in my presence, and it is both accurate and complete.    The note accurately reflects work and decisions made by me.  Yovani Akbar M.D.  5/11/2022  7:27 PM

## 2022-05-12 NOTE — DISCHARGE INSTRUCTIONS
Return the emergency department if you have new or different headache, neck stiffness, confusion, unilateral weakness or fever.

## 2022-05-25 ENCOUNTER — HOSPITAL ENCOUNTER (EMERGENCY)
Facility: MEDICAL CENTER | Age: 27
End: 2022-05-25
Attending: EMERGENCY MEDICINE
Payer: MEDICAID

## 2022-05-25 VITALS
TEMPERATURE: 97.9 F | BODY MASS INDEX: 33.11 KG/M2 | HEIGHT: 67 IN | RESPIRATION RATE: 16 BRPM | DIASTOLIC BLOOD PRESSURE: 73 MMHG | OXYGEN SATURATION: 97 % | WEIGHT: 210.98 LBS | SYSTOLIC BLOOD PRESSURE: 108 MMHG | HEART RATE: 62 BPM

## 2022-05-25 DIAGNOSIS — Q05.9 SPINA BIFIDA, UNSPECIFIED HYDROCEPHALUS PRESENCE, UNSPECIFIED SPINAL REGION (HCC): ICD-10-CM

## 2022-05-25 DIAGNOSIS — M54.41 ACUTE BILATERAL LOW BACK PAIN WITH RIGHT-SIDED SCIATICA: ICD-10-CM

## 2022-05-25 PROCEDURE — 99284 EMERGENCY DEPT VISIT MOD MDM: CPT

## 2022-05-25 PROCEDURE — 96375 TX/PRO/DX INJ NEW DRUG ADDON: CPT

## 2022-05-25 PROCEDURE — 700105 HCHG RX REV CODE 258: Performed by: EMERGENCY MEDICINE

## 2022-05-25 PROCEDURE — 700101 HCHG RX REV CODE 250: Performed by: EMERGENCY MEDICINE

## 2022-05-25 PROCEDURE — 700111 HCHG RX REV CODE 636 W/ 250 OVERRIDE (IP): Performed by: EMERGENCY MEDICINE

## 2022-05-25 PROCEDURE — 96374 THER/PROPH/DIAG INJ IV PUSH: CPT

## 2022-05-25 RX ORDER — CYCLOBENZAPRINE HCL 10 MG
10 TABLET ORAL 3 TIMES DAILY PRN
Qty: 30 TABLET | Refills: 0 | Status: SHIPPED | OUTPATIENT
Start: 2022-05-25 | End: 2022-07-05

## 2022-05-25 RX ORDER — METHYLPREDNISOLONE 4 MG/1
TABLET ORAL
Qty: 1 EACH | Refills: 0 | Status: SHIPPED | OUTPATIENT
Start: 2022-05-25 | End: 2022-07-05

## 2022-05-25 RX ORDER — KETOROLAC TROMETHAMINE 30 MG/ML
30 INJECTION, SOLUTION INTRAMUSCULAR; INTRAVENOUS ONCE
Status: COMPLETED | OUTPATIENT
Start: 2022-05-25 | End: 2022-05-25

## 2022-05-25 RX ORDER — DEXAMETHASONE SODIUM PHOSPHATE 4 MG/ML
4 INJECTION, SOLUTION INTRA-ARTICULAR; INTRALESIONAL; INTRAMUSCULAR; INTRAVENOUS; SOFT TISSUE ONCE
Status: COMPLETED | OUTPATIENT
Start: 2022-05-25 | End: 2022-05-25

## 2022-05-25 RX ORDER — MIDAZOLAM HYDROCHLORIDE 1 MG/ML
0.5 INJECTION INTRAMUSCULAR; INTRAVENOUS ONCE
Status: COMPLETED | OUTPATIENT
Start: 2022-05-25 | End: 2022-05-25

## 2022-05-25 RX ADMIN — MIDAZOLAM HYDROCHLORIDE 0.5 MG: 1 INJECTION, SOLUTION INTRAMUSCULAR; INTRAVENOUS at 13:24

## 2022-05-25 RX ADMIN — DEXAMETHASONE SODIUM PHOSPHATE 4 MG: 4 INJECTION, SOLUTION INTRA-ARTICULAR; INTRALESIONAL; INTRAMUSCULAR; INTRAVENOUS; SOFT TISSUE at 13:24

## 2022-05-25 RX ADMIN — KETAMINE HYDROCHLORIDE 25 MG: 10 INJECTION INTRAMUSCULAR; INTRAVENOUS at 13:27

## 2022-05-25 RX ADMIN — KETOROLAC TROMETHAMINE 30 MG: 30 INJECTION, SOLUTION INTRAMUSCULAR; INTRAVENOUS at 13:24

## 2022-05-25 ASSESSMENT — FIBROSIS 4 INDEX: FIB4 SCORE: 0.47

## 2022-05-25 NOTE — ED TRIAGE NOTES
Sara Jovel  26 y.o. female  Chief Complaint   Patient presents with   • Back Pain     Hx spina bifida. Saw MD a few days ago. Xray ordered a few weeks from now. Unable to sleep due to pain. Pain is worst when lifting leg to take a step.    • Sent by MD     Due to spine disorder and 10/10 pain       Pt ambulatory to triage with steady slow gait for above complaint.     Pt is alert and oriented, speaking in full sentences, follows commands and responds appropriately to questions. Resp are even and unlabored.     Pt placed in lobby. Pt educated on triage process. Pt encouraged to alert staff for any changes. This RN masked and in appropriate PPE during encounter.     Vitals:    05/25/22 1104   BP: 126/87   Pulse: 95   Resp: 16   Temp: 36.6 °C (97.9 °F)   SpO2: 97%

## 2022-05-25 NOTE — ED NOTES
Patient discharged per order. Oral and written discharge instructions reviewed. IV removed. Medications sent to home pharmacy. New medications reviewed. All belongings accounted for and taken with patient. Questions answered, and patient agrees with discharge plan. Patient escorted to lobby with steady gait

## 2022-05-25 NOTE — ED PROVIDER NOTES
ED Provider Note    Scribed for Kun Duncan M.D. by Tadeo iH. 5/25/2022  12:57 PM    Primary care provider: PETE Hdez  Means of arrival: Walk-in  History obtained from: Patient  History limited by: None    CHIEF COMPLAINT  Chief Complaint   Patient presents with   • Back Pain     Hx spina bifida. Saw MD a few days ago. Xray ordered a few weeks from now. Unable to sleep due to pain. Pain is worst when lifting leg to take a step.    • Sent by MD     Due to spine disorder and 10/10 pain       HPI  Sara Jovel is a 26 y.o. female who presents to the Emergency Department for evaluation of lower back pain onset a few days ago. She states that she has a history of spina bifida. She denies any known recent traumas or injury to the area. However, recently she has been experiencing and sharp and stabbing pain in her lower back. Her pain is exacerbated when she is trying to life her leg to step. She has been taking ibuprofen, with mild alleviation She has associated symptoms of increased urinary frequency. She denies any fever, urinary incontinence/retention, or changes in bowel movements. She also denies any history of IV drug usage. She denies any other major past medical history.       REVIEW OF SYSTEMS  Pertinent negatives include no fever, urinary incontinence/retention, or changes in bowel movements. As above, all other systems reviewed and are negative.   See HPI for further details.     PAST MEDICAL HISTORY   has a past medical history of Anxiety and Migraine.    SURGICAL HISTORY   has a past surgical history that includes tonsillectomy and appendectomy.    SOCIAL HISTORY  Social History     Tobacco Use   • Smoking status: Never Smoker   • Smokeless tobacco: Never Used   Vaping Use   • Vaping Use: Every day   • Substances: Nicotine   Substance Use Topics   • Alcohol use: Yes     Comment: socially   • Drug use: Yes     Types: Inhaled     Comment: marijuana      Social History     Substance  "and Sexual Activity   Drug Use Yes   • Types: Inhaled    Comment: marijuana       FAMILY HISTORY  History reviewed. No pertinent family history.    CURRENT MEDICATIONS  Home Medications     Reviewed by Clive Santana R.N. (Registered Nurse) on 05/25/22 at 1144  Med List Status: Partial   Medication Last Dose Status   ALBUTEROL INH  Active   azithromycin (ZITHROMAX) 250 MG TABS  Active   Diazepam (VALIUM PO)  Active   hydrocodone-acetaminophen (VICODIN) 5-500 MG TABS  Active   ibuprofen (MOTRIN) 800 MG Tab  Active   promethazine (PHENERGAN) 25 MG TABS  Active                ALLERGIES  Allergies   Allergen Reactions   • Nkda [No Known Drug Allergy]        PHYSICAL EXAM  VITAL SIGNS: /81   Pulse 72   Temp 36.6 °C (97.9 °F) (Temporal)   Resp 16   Ht 1.702 m (5' 7\")   Wt 95.7 kg (210 lb 15.7 oz)   LMP 05/10/2022 (Exact Date)   SpO2 99%   BMI 33.04 kg/m²   Constitutional: Well developed, Well nourished, No acute distress, Non-toxic appearance.   HENT: Normocephalic, Atraumatic, Bilateral external ears normal, Oropharynx is clear mucous membranes are moist. No oral exudates or nasal discharge.   Eyes: Pupils are equal round and reactive, EOMI, Conjunctiva normal, No discharge.   Neck: Normal range of motion, No tenderness, Supple, No stridor. No meningismus.  Lymphatic: No lymphadenopathy noted.   Cardiovascular: Regular rate and rhythm without murmur rub or gallop.  Thorax & Lungs: Clear breath sounds bilaterally without wheezes, rhonchi or rales. There is no chest wall tenderness.   Abdomen: Soft non-tender non-distended. There is no rebound or guarding. No organomegaly is appreciated. Bowel sounds are normal.  Skin: Normal without rash.   Back: No CVA tenderness. Upper lumbar down to lower lumbar midline tenderness.    Extremities: Intact distal pulses, No edema, No tenderness, No cyanosis, No clubbing. Capillary refill is less than 2 seconds.  Musculoskeletal: Good range of motion in all major joints. " No tenderness to palpation or major deformities noted.   Neurologic: Alert & oriented x 3, Normal motor function, Normal sensory function, No focal deficits noted. Diminished reflexes throughout. Hyporeflexive throughout.  Psychiatric: Affect normal, Judgment normal, Mood normal. There is no suicidal ideation or patient reported hallucinations.      COURSE & MEDICAL DECISION MAKING  Nursing notes, VS, PMSFHx reviewed in chart.    12:57 PM Patient seen and examined at bedside. Patient was treated with ketalar 25 mg in NS 50 mL, versed injection 0.5 mg, toradol injection 30 mg, and decadron injection 4 mg for her symptoms.     The patient likely has broad-based disc herniation affecting the right side greater than left but there is no signs of cauda equina syndrome or spinal epidural abscess.  Like she would benefit from ketamine therapy.    1:50 PM Patient was reevaluated at bedside. Patient reports feeling improved after medication administration.I discussed plan for discharge and follow-up. I informed a the patient I would prescribe a medrol dosepak to assist in alleviating the patient's pain. The patient is stable for discharge at this time and will return for any new or worsening symptoms. Patient verbalizes understanding and support with my plan for discharge.     The patient will return for new or worsening symptoms and is stable at the time of discharge.  She will follow-up with her doctor is discharged in stable condition understands her plan of care including medications    DISPOSITION:  Patient will be discharged home in stable condition.    FINAL IMPRESSION  1. Acute bilateral low back pain with right-sided sciatica    2. Spina bifida, unspecified hydrocephalus presence, unspecified spinal region (HCC)          Tadeo KELLY (Sheryl), am scribing for, and in the presence of, Kun Duncan M.D..    Electronically signed by: Tadeo Hi (Sheryl), 5/25/2022    Kun KELLY M.D. personally performed  the services described in this documentation, as scribed by Tadeo Hi in my presence, and it is both accurate and complete.    The note accurately reflects work and decisions made by me.  Kun Duncan M.D.  5/25/2022  4:53 PM

## 2022-07-05 ENCOUNTER — HOSPITAL ENCOUNTER (EMERGENCY)
Facility: MEDICAL CENTER | Age: 27
End: 2022-07-05
Attending: EMERGENCY MEDICINE
Payer: MEDICAID

## 2022-07-05 VITALS
SYSTOLIC BLOOD PRESSURE: 118 MMHG | WEIGHT: 216.05 LBS | BODY MASS INDEX: 32.74 KG/M2 | HEIGHT: 68 IN | TEMPERATURE: 97.1 F | OXYGEN SATURATION: 97 % | DIASTOLIC BLOOD PRESSURE: 77 MMHG | RESPIRATION RATE: 16 BRPM | HEART RATE: 85 BPM

## 2022-07-05 DIAGNOSIS — N93.9 VAGINAL BLEEDING: ICD-10-CM

## 2022-07-05 DIAGNOSIS — R10.30 LOWER ABDOMINAL PAIN: ICD-10-CM

## 2022-07-05 LAB
ALBUMIN SERPL BCP-MCNC: 4 G/DL (ref 3.2–4.9)
ALBUMIN/GLOB SERPL: 1.3 G/DL
ALP SERPL-CCNC: 86 U/L (ref 30–99)
ALT SERPL-CCNC: 16 U/L (ref 2–50)
ANION GAP SERPL CALC-SCNC: 14 MMOL/L (ref 7–16)
APPEARANCE UR: CLEAR
AST SERPL-CCNC: 23 U/L (ref 12–45)
BACTERIA #/AREA URNS HPF: NEGATIVE /HPF
BASOPHILS # BLD AUTO: 0.5 % (ref 0–1.8)
BASOPHILS # BLD: 0.06 K/UL (ref 0–0.12)
BILIRUB SERPL-MCNC: 0.2 MG/DL (ref 0.1–1.5)
BILIRUB UR QL STRIP.AUTO: NEGATIVE
BUN SERPL-MCNC: 8 MG/DL (ref 8–22)
CALCIUM SERPL-MCNC: 8.8 MG/DL (ref 8.5–10.5)
CHLORIDE SERPL-SCNC: 105 MMOL/L (ref 96–112)
CO2 SERPL-SCNC: 17 MMOL/L (ref 20–33)
COLOR UR: YELLOW
CREAT SERPL-MCNC: 0.61 MG/DL (ref 0.5–1.4)
EOSINOPHIL # BLD AUTO: 0.37 K/UL (ref 0–0.51)
EOSINOPHIL NFR BLD: 2.9 % (ref 0–6.9)
EPI CELLS #/AREA URNS HPF: NORMAL /HPF
ERYTHROCYTE [DISTWIDTH] IN BLOOD BY AUTOMATED COUNT: 45.1 FL (ref 35.9–50)
GFR SERPLBLD CREATININE-BSD FMLA CKD-EPI: 126 ML/MIN/1.73 M 2
GLOBULIN SER CALC-MCNC: 3.2 G/DL (ref 1.9–3.5)
GLUCOSE SERPL-MCNC: 82 MG/DL (ref 65–99)
GLUCOSE UR STRIP.AUTO-MCNC: NEGATIVE MG/DL
HCG SERPL QL: NEGATIVE
HCT VFR BLD AUTO: 40.5 % (ref 37–47)
HGB BLD-MCNC: 13.1 G/DL (ref 12–16)
HYALINE CASTS #/AREA URNS LPF: NORMAL /LPF
IMM GRANULOCYTES # BLD AUTO: 0.08 K/UL (ref 0–0.11)
IMM GRANULOCYTES NFR BLD AUTO: 0.6 % (ref 0–0.9)
KETONES UR STRIP.AUTO-MCNC: NEGATIVE MG/DL
LEUKOCYTE ESTERASE UR QL STRIP.AUTO: NEGATIVE
LYMPHOCYTES # BLD AUTO: 1.47 K/UL (ref 1–4.8)
LYMPHOCYTES NFR BLD: 11.4 % (ref 22–41)
MCH RBC QN AUTO: 27.2 PG (ref 27–33)
MCHC RBC AUTO-ENTMCNC: 32.3 G/DL (ref 33.6–35)
MCV RBC AUTO: 84.2 FL (ref 81.4–97.8)
MICRO URNS: ABNORMAL
MONOCYTES # BLD AUTO: 0.85 K/UL (ref 0–0.85)
MONOCYTES NFR BLD AUTO: 6.6 % (ref 0–13.4)
NEUTROPHILS # BLD AUTO: 10.06 K/UL (ref 2–7.15)
NEUTROPHILS NFR BLD: 78 % (ref 44–72)
NITRITE UR QL STRIP.AUTO: NEGATIVE
NRBC # BLD AUTO: 0 K/UL
NRBC BLD-RTO: 0 /100 WBC
PH UR STRIP.AUTO: 6.5 [PH] (ref 5–8)
PLATELET # BLD AUTO: 251 K/UL (ref 164–446)
PMV BLD AUTO: 9.3 FL (ref 9–12.9)
POTASSIUM SERPL-SCNC: 3.5 MMOL/L (ref 3.6–5.5)
PROT SERPL-MCNC: 7.2 G/DL (ref 6–8.2)
PROT UR QL STRIP: NEGATIVE MG/DL
RBC # BLD AUTO: 4.81 M/UL (ref 4.2–5.4)
RBC # URNS HPF: NORMAL /HPF
RBC UR QL AUTO: ABNORMAL
SODIUM SERPL-SCNC: 136 MMOL/L (ref 135–145)
SP GR UR STRIP.AUTO: 1.01
UROBILINOGEN UR STRIP.AUTO-MCNC: 0.2 MG/DL
WBC # BLD AUTO: 12.9 K/UL (ref 4.8–10.8)
WBC #/AREA URNS HPF: NORMAL /HPF

## 2022-07-05 PROCEDURE — 80053 COMPREHEN METABOLIC PANEL: CPT

## 2022-07-05 PROCEDURE — 81001 URINALYSIS AUTO W/SCOPE: CPT

## 2022-07-05 PROCEDURE — 84703 CHORIONIC GONADOTROPIN ASSAY: CPT

## 2022-07-05 PROCEDURE — 36415 COLL VENOUS BLD VENIPUNCTURE: CPT

## 2022-07-05 PROCEDURE — 99284 EMERGENCY DEPT VISIT MOD MDM: CPT

## 2022-07-05 PROCEDURE — 85025 COMPLETE CBC W/AUTO DIFF WBC: CPT

## 2022-07-05 RX ORDER — SUMATRIPTAN 25 MG/1
25-100 TABLET, FILM COATED ORAL
COMMUNITY

## 2022-07-05 RX ORDER — AMITRIPTYLINE HYDROCHLORIDE 10 MG/1
10 TABLET, FILM COATED ORAL NIGHTLY
COMMUNITY

## 2022-07-05 ASSESSMENT — FIBROSIS 4 INDEX: FIB4 SCORE: 0.47

## 2022-07-05 NOTE — ED TRIAGE NOTES
Sara Jovel  26 y.o.  Chief Complaint   Patient presents with   • Pregnancy Test     LMP 2022  Has not taken a home pregnancy test     • Vaginal Bleeding     Sudden rush of blood today while at work  + concurrent lower abdominal cramping radiating to low back, rates 10  Expelled large clot into toilet earlier today     Ambulatory to triage with steady gait for above. A & O x 4, GCS 15. Mask in place.    Triage process explained to patient, apologized for wait time, and returned to lobby.

## 2022-07-06 NOTE — DISCHARGE INSTRUCTIONS
Follow-up with primary care or OB/GYN/family medicine/OB/GYN for reevaluation and further work-up if vaginal bleeding persists.    Your pregnancy test is negative today.  There is no evidence for urinary tract infection.    Motrin 600 mg every 6 hours as needed for discomfort or cramping.    Return to the emergency department if her persistent or worsening abdominal pain, flank pain, vaginal bleeding (more than 1 pad or tampon per hour for 4 to 5 hours straight), fever, vomiting, syncope or other new concerns.

## 2022-07-06 NOTE — ED PROVIDER NOTES
"ED Provider Note    CHIEF COMPLAINT  Chief Complaint   Patient presents with   • Pregnancy Test     LMP 2022  Has not taken a home pregnancy test     • Vaginal Bleeding     Sudden rush of blood today while at work  + concurrent lower abdominal cramping radiating to low back, rates 6/10  Expelled large clot into toilet earlier today       HPI  Sara Jovel is a 26 y.o. female who presents to the emergency department through triage for vaginal bleeding.  Patient states she had some lower abdominal cramping this morning, then had a \"gush of blood\" while at work today.  She continues to have vaginal bleeding, 3 tampons, 1 pad throughout the course of the day.  She did pass one large clot which concerned her.  LMP -15/22 and then on further questioning also had a period in -.  She is due for her period this week.  G3, P2.  No abnormal vaginal discharge, odor or concern for STD.  No flank pain.  Dark urine without frequency, urgency or dysuria.  No fever or chills.  No nausea or vomiting.    REVIEW OF SYSTEMS  See HPI for further details. All other systems are negative.     PAST MEDICAL HISTORY   has a past medical history of Anxiety, Migraine, and Spina bifida (HCC).    SOCIAL HISTORY  Social History     Tobacco Use   • Smoking status: Never Smoker   • Smokeless tobacco: Never Used   Vaping Use   • Vaping Use: Every day   • Substances: Nicotine   Substance and Sexual Activity   • Alcohol use: Yes     Comment: socially   • Drug use: Yes     Types: Inhaled     Comment: rare THC   • Sexual activity: Not on file       SURGICAL HISTORY   has a past surgical history that includes tonsillectomy and appendectomy.    CURRENT MEDICATIONS  Home Medications     Reviewed by Shannon Guerrero R.N. (Registered Nurse) on 22 at 1614  Med List Status: Partial   Medication Last Dose Status   amitriptyline (ELAVIL) 10 MG Tab  Active   SUMAtriptan (IMITREX) 25 MG Tab tablet  Active          " "      ALLERGIES  Allergies   Allergen Reactions   • Shellfish Allergy Anaphylaxis       PHYSICAL EXAM  VITAL SIGNS: /77   Pulse 85   Temp 36.2 °C (97.1 °F) (Temporal)   Resp 16   Ht 1.715 m (5' 7.5\")   Wt 98 kg (216 lb 0.8 oz)   LMP 05/09/2022   SpO2 97%   BMI 33.34 kg/m²   Pulse ox interpretation: I interpret this pulse ox as normal.  Constitutional: Alert in no apparent distress.  HENT: Normocephalic, atraumatic. Bilateral external ears normal, Nose normal.  Eyes:Conjunctiva normal.   Neck: Normal range of motion  Lymphatic: No lymphadenopathy noted.   Cardiovascular: Regular rate and rhythm, no murmurs. Distal pulses intact.    Thorax & Lungs: Normal breath sounds.  No wheezing/rales/ronchi. No increased work of breathing  Abdomen: Soft, non-distended.  Mild discomfort with palpation in the suprapubic region.  No rebound, guarding or peritonitis.  No CVA tenderness percussion.  Skin: Warm, Dry, No erythema, No rash.   Musculoskeletal: Good range of motion in all major joints.    Neurologic: Alert and orient x4.  Ambulates independently  Psychiatric: Affect normal, Judgment normal, Mood normal.       DIAGNOSTIC STUDIES / PROCEDURES    LABS  Results for orders placed or performed during the hospital encounter of 07/05/22   CBC WITH DIFFERENTIAL   Result Value Ref Range    WBC 12.9 (H) 4.8 - 10.8 K/uL    RBC 4.81 4.20 - 5.40 M/uL    Hemoglobin 13.1 12.0 - 16.0 g/dL    Hematocrit 40.5 37.0 - 47.0 %    MCV 84.2 81.4 - 97.8 fL    MCH 27.2 27.0 - 33.0 pg    MCHC 32.3 (L) 33.6 - 35.0 g/dL    RDW 45.1 35.9 - 50.0 fL    Platelet Count 251 164 - 446 K/uL    MPV 9.3 9.0 - 12.9 fL    Neutrophils-Polys 78.00 (H) 44.00 - 72.00 %    Lymphocytes 11.40 (L) 22.00 - 41.00 %    Monocytes 6.60 0.00 - 13.40 %    Eosinophils 2.90 0.00 - 6.90 %    Basophils 0.50 0.00 - 1.80 %    Immature Granulocytes 0.60 0.00 - 0.90 %    Nucleated RBC 0.00 /100 WBC    Neutrophils (Absolute) 10.06 (H) 2.00 - 7.15 K/uL    Lymphs (Absolute) " 1.47 1.00 - 4.80 K/uL    Monos (Absolute) 0.85 0.00 - 0.85 K/uL    Eos (Absolute) 0.37 0.00 - 0.51 K/uL    Baso (Absolute) 0.06 0.00 - 0.12 K/uL    Immature Granulocytes (abs) 0.08 0.00 - 0.11 K/uL    NRBC (Absolute) 0.00 K/uL   COMP METABOLIC PANEL   Result Value Ref Range    Sodium 136 135 - 145 mmol/L    Potassium 3.5 (L) 3.6 - 5.5 mmol/L    Chloride 105 96 - 112 mmol/L    Co2 17 (L) 20 - 33 mmol/L    Anion Gap 14.0 7.0 - 16.0    Glucose 82 65 - 99 mg/dL    Bun 8 8 - 22 mg/dL    Creatinine 0.61 0.50 - 1.40 mg/dL    Calcium 8.8 8.5 - 10.5 mg/dL    AST(SGOT) 23 12 - 45 U/L    ALT(SGPT) 16 2 - 50 U/L    Alkaline Phosphatase 86 30 - 99 U/L    Total Bilirubin 0.2 0.1 - 1.5 mg/dL    Albumin 4.0 3.2 - 4.9 g/dL    Total Protein 7.2 6.0 - 8.2 g/dL    Globulin 3.2 1.9 - 3.5 g/dL    A-G Ratio 1.3 g/dL   HCG QUAL SERUM   Result Value Ref Range    Beta-Hcg Qualitative Serum Negative Negative   ESTIMATED GFR   Result Value Ref Range    GFR (CKD-EPI) 126 >60 mL/min/1.73 m 2   URINALYSIS    Specimen: Urine   Result Value Ref Range    Color Yellow     Character Clear     Specific Gravity 1.009 <1.035    Ph 6.5 5.0 - 8.0    Glucose Negative Negative mg/dL    Ketones Negative Negative mg/dL    Protein Negative Negative mg/dL    Bilirubin Negative Negative    Urobilinogen, Urine 0.2 Negative    Nitrite Negative Negative    Leukocyte Esterase Negative Negative    Occult Blood Small (A) Negative    Micro Urine Req Microscopic    URINE MICROSCOPIC (W/UA)   Result Value Ref Range    WBC 0-2 /hpf    RBC 0-2 /hpf    Bacteria Negative None /hpf    Epithelial Cells Few /hpf    Hyaline Cast 0-2 /lpf     COURSE & MEDICAL DECISION MAKING  Nursing notes and vital signs were reviewed. (See chart for details)  The patients records were reviewed, history was obtained from the patient;    ED evaluation for low abdominal pain and vaginal bleeding is unrevealing and most suggestive of monthly menstruation.  Pregnancy is negative.  Urinalysis within  normal limits.  Labs, per protocol prior to my evaluation within normal limits as well, nonspecific leukocytosis, although mild at 12.9, no anemia.  Hemodynamically stable without fever, tachycardia, hypotension.  Abdominal exam is benign.    Patient is stable for discharge at this time, anticipatory guidance provided, Motrin for discomfort, close follow-up is encouraged, and strict ED return instructions have been detailed. Patient is agreeable to the disposition and plan.    FINAL IMPRESSION  (N93.9) Vaginal bleeding  (R10.30) Lower abdominal pain      Electronically signed by: Susan Velez D.O., 7/5/2022 6:20 PM      This dictation was created using voice recognition software. The accuracy of the dictation is limited to the abilities of the software. I expect there may be some errors of grammar and possibly content. The nursing notes were reviewed and certain aspects of this information were incorporated into this note.

## 2022-08-24 ENCOUNTER — APPOINTMENT (OUTPATIENT)
Dept: URGENT CARE | Facility: PHYSICIAN GROUP | Age: 27
End: 2022-08-24

## 2023-08-06 ENCOUNTER — APPOINTMENT (OUTPATIENT)
Dept: RADIOLOGY | Facility: MEDICAL CENTER | Age: 28
End: 2023-08-06
Attending: EMERGENCY MEDICINE
Payer: MEDICAID

## 2023-08-06 ENCOUNTER — HOSPITAL ENCOUNTER (EMERGENCY)
Facility: MEDICAL CENTER | Age: 28
End: 2023-08-06
Attending: EMERGENCY MEDICINE
Payer: MEDICAID

## 2023-08-06 VITALS
SYSTOLIC BLOOD PRESSURE: 127 MMHG | TEMPERATURE: 98.2 F | HEIGHT: 68 IN | BODY MASS INDEX: 35.75 KG/M2 | WEIGHT: 235.89 LBS | HEART RATE: 88 BPM | RESPIRATION RATE: 16 BRPM | DIASTOLIC BLOOD PRESSURE: 80 MMHG | OXYGEN SATURATION: 96 %
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
## 2023-08-06 DIAGNOSIS — R68.84 JAW PAIN: ICD-10-CM

## 2023-08-06 DIAGNOSIS — M54.50 ACUTE MIDLINE LOW BACK PAIN WITHOUT SCIATICA: ICD-10-CM

## 2023-08-06 LAB — HCG UR QL: NEGATIVE

## 2023-08-06 PROCEDURE — 99284 EMERGENCY DEPT VISIT MOD MDM: CPT

## 2023-08-06 PROCEDURE — 81025 URINE PREGNANCY TEST: CPT

## 2023-08-06 PROCEDURE — 70486 CT MAXILLOFACIAL W/O DYE: CPT

## 2023-08-06 PROCEDURE — 700102 HCHG RX REV CODE 250 W/ 637 OVERRIDE(OP): Performed by: EMERGENCY MEDICINE

## 2023-08-06 PROCEDURE — 72131 CT LUMBAR SPINE W/O DYE: CPT

## 2023-08-06 PROCEDURE — A9270 NON-COVERED ITEM OR SERVICE: HCPCS | Performed by: EMERGENCY MEDICINE

## 2023-08-06 RX ORDER — IBUPROFEN 600 MG/1
600 TABLET ORAL ONCE
Status: DISCONTINUED | OUTPATIENT
Start: 2023-08-06 | End: 2023-08-06

## 2023-08-06 RX ORDER — IBUPROFEN 600 MG/1
600 TABLET ORAL EVERY 6 HOURS PRN
Qty: 21 TABLET | Refills: 0 | Status: SHIPPED | OUTPATIENT
Start: 2023-08-06 | End: 2023-08-13

## 2023-08-06 RX ORDER — OXYCODONE HYDROCHLORIDE AND ACETAMINOPHEN 5; 325 MG/1; MG/1
2 TABLET ORAL ONCE
Status: COMPLETED | OUTPATIENT
Start: 2023-08-06 | End: 2023-08-06

## 2023-08-06 RX ORDER — CYCLOBENZAPRINE HCL 5 MG
5-10 TABLET ORAL 3 TIMES DAILY PRN
Qty: 30 TABLET | Refills: 0 | Status: SHIPPED | OUTPATIENT
Start: 2023-08-06

## 2023-08-06 RX ORDER — ACETAMINOPHEN 500 MG
500-1000 TABLET ORAL EVERY 6 HOURS PRN
Qty: 30 TABLET | Refills: 0 | Status: SHIPPED | OUTPATIENT
Start: 2023-08-06

## 2023-08-06 RX ORDER — CYCLOBENZAPRINE HCL 10 MG
10 TABLET ORAL ONCE
Status: COMPLETED | OUTPATIENT
Start: 2023-08-06 | End: 2023-08-06

## 2023-08-06 RX ADMIN — OXYCODONE AND ACETAMINOPHEN 2 TABLET: 5; 325 TABLET ORAL at 11:55

## 2023-08-06 RX ADMIN — CYCLOBENZAPRINE 10 MG: 10 TABLET, FILM COATED ORAL at 12:23

## 2023-08-06 ASSESSMENT — FIBROSIS 4 INDEX: FIB4 SCORE: 0.6

## 2023-08-06 ASSESSMENT — PAIN DESCRIPTION - PAIN TYPE
TYPE: ACUTE PAIN
TYPE: ACUTE PAIN

## 2023-08-06 NOTE — ED NOTES
Patient to assigned room via wheelchair. Patient dressed in gown and placed on monitor with call light in reach. Chart up for ERP to see.
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
## 2023-08-06 NOTE — ED NOTES
Patient brought to and from restroom via wheelchair without incident. PRATIK Luu at bedside for medication administration. Urine sample obtained,labelled appropriately, and sent to lab.

## 2023-08-06 NOTE — DISCHARGE INSTRUCTIONS
Please discuss the following findings with your regular doctor:  CT-LSPINE W/O PLUS RECONS   Final Result      1.  No evidence of fracture of the lumbar spine.      2.  Grade 1 spondylolysis at L5-S1.      3.  Normal variant incomplete fusion of the posterior elements of the L5 and S1 vertebra.      CT-MAXILLOFACIAL W/O PLUS RECONS   Final Result      No evidence of facial fracture.        Labs Reviewed   HCG QUALITATIVE UR
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
## 2023-08-06 NOTE — ED NOTES
Discharge paperwork provided, education provided by ERP. All questions and concerns addressed by Dr. Dickson. Pt ambulatory out of ER with all belongings and steady gait.

## 2024-02-04 ENCOUNTER — APPOINTMENT (OUTPATIENT)
Dept: RADIOLOGY | Facility: MEDICAL CENTER | Age: 29
End: 2024-02-04
Attending: STUDENT IN AN ORGANIZED HEALTH CARE EDUCATION/TRAINING PROGRAM
Payer: MEDICAID

## 2024-02-04 ENCOUNTER — HOSPITAL ENCOUNTER (EMERGENCY)
Facility: MEDICAL CENTER | Age: 29
End: 2024-02-04
Attending: STUDENT IN AN ORGANIZED HEALTH CARE EDUCATION/TRAINING PROGRAM
Payer: MEDICAID

## 2024-02-04 VITALS
RESPIRATION RATE: 17 BRPM | HEIGHT: 67 IN | TEMPERATURE: 97.1 F | HEART RATE: 86 BPM | SYSTOLIC BLOOD PRESSURE: 129 MMHG | OXYGEN SATURATION: 97 % | DIASTOLIC BLOOD PRESSURE: 88 MMHG | WEIGHT: 220 LBS | BODY MASS INDEX: 34.53 KG/M2

## 2024-02-04 DIAGNOSIS — S81.012A LACERATION OF LEFT KNEE, INITIAL ENCOUNTER: ICD-10-CM

## 2024-02-04 DIAGNOSIS — S70.02XA CONTUSION OF LEFT HIP, INITIAL ENCOUNTER: ICD-10-CM

## 2024-02-04 DIAGNOSIS — S80.02XA CONTUSION OF LEFT KNEE, INITIAL ENCOUNTER: ICD-10-CM

## 2024-02-04 LAB — HCG UR QL: NEGATIVE

## 2024-02-04 PROCEDURE — 304999 HCHG REPAIR-SIMPLE/INTERMED LEVEL 1

## 2024-02-04 PROCEDURE — 99284 EMERGENCY DEPT VISIT MOD MDM: CPT

## 2024-02-04 PROCEDURE — 73564 X-RAY EXAM KNEE 4 OR MORE: CPT | Mod: LT

## 2024-02-04 PROCEDURE — 700101 HCHG RX REV CODE 250: Mod: UD | Performed by: STUDENT IN AN ORGANIZED HEALTH CARE EDUCATION/TRAINING PROGRAM

## 2024-02-04 PROCEDURE — 73501 X-RAY EXAM HIP UNI 1 VIEW: CPT | Mod: LT

## 2024-02-04 PROCEDURE — 304217 HCHG IRRIGATION SYSTEM

## 2024-02-04 PROCEDURE — 81025 URINE PREGNANCY TEST: CPT

## 2024-02-04 PROCEDURE — 305308 HCHG STAPLER,SKIN,DISP.

## 2024-02-04 RX ORDER — LIDOCAINE HYDROCHLORIDE AND EPINEPHRINE 10; 10 MG/ML; UG/ML
20 INJECTION, SOLUTION INFILTRATION; PERINEURAL ONCE
Status: COMPLETED | OUTPATIENT
Start: 2024-02-04 | End: 2024-02-04

## 2024-02-04 RX ADMIN — LIDOCAINE HYDROCHLORIDE AND EPINEPHRINE 20 ML: 10; 10 INJECTION, SOLUTION INFILTRATION; PERINEURAL at 21:00

## 2024-02-04 ASSESSMENT — FIBROSIS 4 INDEX: FIB4 SCORE: 0.62

## 2024-02-05 NOTE — ED NOTES
Patient ambulatory with steady gait to bathroom and back. Pt resting with even chest rise and fall, reports no needs at this time, call light available and in reach.

## 2024-02-05 NOTE — ED NOTES
NOTED   RN and ERP bedside for wound numbing. Pt resting with even chest rise and fall, reports no needs at this time, call light available and in reach.

## 2024-02-05 NOTE — ED NOTES
Patient wound irrigated with 1000ml NS, antibiotic ointment applied and wound wrapped, patient tolerated well.

## 2024-02-05 NOTE — ED NOTES
Splint applied and crutches adjusted and crutch teaching provided. Patient given discharge instructions and verbalizes understanding. Left with all belongings and ambulates to 's car  with steady gait accompanied by RN.

## 2024-02-05 NOTE — ED NOTES
Bedside report received from off going RN Porsche, assumed care of patient.  POC discussed with patient. Call light within reach, all needs addressed at this time.       Fall risk interventions in place: Keep floor surfaces clean and dry and Accompanied to restroom (all applicable per [facility] Fall risk assessment)   Continuous monitoring: Not Applicable   IVF/IV medications: Not Applicable   Oxygen: Room Air  Bedside sitter: Not Applicable   Isolation: Not Applicable     Additional blankets provided for patient.

## 2024-02-05 NOTE — ED NOTES
RN rounded. Pt resting with even chest rise and fall, reports no needs at this time, call light available and in reach.
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
## 2024-03-17 ENCOUNTER — APPOINTMENT (OUTPATIENT)
Dept: RADIOLOGY | Facility: MEDICAL CENTER | Age: 29
End: 2024-03-17
Attending: EMERGENCY MEDICINE
Payer: MEDICAID

## 2024-03-17 ENCOUNTER — HOSPITAL ENCOUNTER (EMERGENCY)
Facility: MEDICAL CENTER | Age: 29
End: 2024-03-18
Attending: EMERGENCY MEDICINE
Payer: MEDICAID

## 2024-03-17 VITALS
HEIGHT: 67 IN | DIASTOLIC BLOOD PRESSURE: 87 MMHG | HEART RATE: 109 BPM | TEMPERATURE: 98.5 F | BODY MASS INDEX: 31.39 KG/M2 | RESPIRATION RATE: 18 BRPM | SYSTOLIC BLOOD PRESSURE: 131 MMHG | OXYGEN SATURATION: 98 % | WEIGHT: 200 LBS

## 2024-03-17 DIAGNOSIS — S82.831A CLOSED FRACTURE OF DISTAL END OF RIGHT FIBULA, UNSPECIFIED FRACTURE MORPHOLOGY, INITIAL ENCOUNTER: ICD-10-CM

## 2024-03-17 PROCEDURE — 29515 APPLICATION SHORT LEG SPLINT: CPT

## 2024-03-17 PROCEDURE — 96375 TX/PRO/DX INJ NEW DRUG ADDON: CPT | Mod: XU

## 2024-03-17 PROCEDURE — A9270 NON-COVERED ITEM OR SERVICE: HCPCS | Mod: UD | Performed by: EMERGENCY MEDICINE

## 2024-03-17 PROCEDURE — 73610 X-RAY EXAM OF ANKLE: CPT | Mod: RT

## 2024-03-17 PROCEDURE — 73630 X-RAY EXAM OF FOOT: CPT | Mod: RT

## 2024-03-17 PROCEDURE — 99285 EMERGENCY DEPT VISIT HI MDM: CPT

## 2024-03-17 PROCEDURE — 302874 HCHG BANDAGE ACE 2 OR 3""

## 2024-03-17 PROCEDURE — 96376 TX/PRO/DX INJ SAME DRUG ADON: CPT | Mod: XU

## 2024-03-17 PROCEDURE — 302875 HCHG BANDAGE ACE 4 OR 6""

## 2024-03-17 PROCEDURE — 700111 HCHG RX REV CODE 636 W/ 250 OVERRIDE (IP): Mod: JZ,UD | Performed by: EMERGENCY MEDICINE

## 2024-03-17 PROCEDURE — 700102 HCHG RX REV CODE 250 W/ 637 OVERRIDE(OP): Mod: UD | Performed by: EMERGENCY MEDICINE

## 2024-03-17 PROCEDURE — 96374 THER/PROPH/DIAG INJ IV PUSH: CPT | Mod: XU

## 2024-03-17 RX ORDER — ONDANSETRON 2 MG/ML
4 INJECTION INTRAMUSCULAR; INTRAVENOUS ONCE
Status: COMPLETED | OUTPATIENT
Start: 2024-03-17 | End: 2024-03-17

## 2024-03-17 RX ORDER — MORPHINE SULFATE 4 MG/ML
4 INJECTION INTRAVENOUS ONCE
Status: COMPLETED | OUTPATIENT
Start: 2024-03-17 | End: 2024-03-17

## 2024-03-17 RX ORDER — MORPHINE SULFATE 15 MG/1
7.5 TABLET ORAL EVERY 6 HOURS PRN
Qty: 12 TABLET | Refills: 0 | Status: SHIPPED | OUTPATIENT
Start: 2024-03-17 | End: 2024-03-24

## 2024-03-17 RX ORDER — HYDROCODONE BITARTRATE AND ACETAMINOPHEN 5; 325 MG/1; MG/1
1 TABLET ORAL ONCE
Status: COMPLETED | OUTPATIENT
Start: 2024-03-17 | End: 2024-03-17

## 2024-03-17 RX ADMIN — MORPHINE SULFATE 4 MG: 4 INJECTION, SOLUTION INTRAMUSCULAR; INTRAVENOUS at 21:52

## 2024-03-17 RX ADMIN — MORPHINE SULFATE 4 MG: 4 INJECTION, SOLUTION INTRAMUSCULAR; INTRAVENOUS at 22:25

## 2024-03-17 RX ADMIN — ONDANSETRON 4 MG: 2 INJECTION INTRAMUSCULAR; INTRAVENOUS at 21:03

## 2024-03-17 RX ADMIN — HYDROCODONE BITARTRATE AND ACETAMINOPHEN 1 TABLET: 5; 325 TABLET ORAL at 23:20

## 2024-03-17 RX ADMIN — MORPHINE SULFATE 4 MG: 4 INJECTION, SOLUTION INTRAMUSCULAR; INTRAVENOUS at 21:03

## 2024-03-17 RX ADMIN — ONDANSETRON 4 MG: 2 INJECTION INTRAMUSCULAR; INTRAVENOUS at 23:13

## 2024-03-17 ASSESSMENT — FIBROSIS 4 INDEX: FIB4 SCORE: 0.62

## 2024-03-17 ASSESSMENT — PAIN DESCRIPTION - PAIN TYPE
TYPE: ACUTE PAIN
TYPE: ACUTE PAIN

## 2024-03-17 NOTE — Clinical Note
Sara Jovel was seen and treated in our emergency department on 3/17/2024.  She may return to work on 03/19/2024.  Please excuse from work tomorrow.  She must not put any weight on her right foot and must use crutches.     If you have any questions or concerns, please don't hesitate to call.      Yovani Akbar M.D.

## 2024-03-18 NOTE — ED TRIAGE NOTES
".  Chief Complaint   Patient presents with    Ankle Pain     Pt report R ankle pain, 10/10. Pt report she was wearing high heels and slipped. Noted swelling on pt R ankle.        Pt wheelchair to triage. Pt A&Ox4, for above complaint.     Pt to lobby. Pt educated on alerting staff in changes to condition. Pt verbalized understanding.     BP (!) 149/100   Pulse (!) 116   Temp 37.1 °C (98.7 °F) (Temporal)   Resp 16   Ht 1.702 m (5' 7\")   Wt 90.7 kg (200 lb)   SpO2 98%   BMI 31.32 kg/m²    "

## 2024-03-18 NOTE — DISCHARGE INSTRUCTIONS
Nonweightbearing with the right foot.  Use the crutches.  Ice over the splint 20 minutes on, 20 minutes off as often as possible.  Elevate your foot above the level of your heart to help decrease swelling.  Follow-up with orthopedics.  Return the emergency department if you have increasing pain, numbness, tingling or cold blue foot.

## 2024-03-18 NOTE — ED NOTES
PIV removed, catheter intact. Discharge education provided. Discharge paperwork signed by pt. Prescriptions to be picked up by pt. All questions answered. All belongings with pt. Pt brought to lobby via wheelchair.

## 2024-03-18 NOTE — ED NOTES
Pt brought to ORT 02 via wheelchair. Pt placed on monitors. Chart up for ERP. Partner and daughter in room. Pt in noticeable pain and tender to touch.

## 2024-03-18 NOTE — ED PROVIDER NOTES
ER Provider Note    Scribed for Yovani Akbar M.d. by Austin Maurer. 3/17/2024  8:25 PM    Primary Care Provider: Pcp Pt States None    CHIEF COMPLAINT  Chief Complaint   Patient presents with    Ankle Pain     Pt report R ankle pain, 10/10. Pt report she was wearing high heels and slipped. Noted swelling on pt R ankle.      EXTERNAL RECORDS REVIEWED  Other None    HPI/ROS  LIMITATION TO HISTORY   Select: : None  OUTSIDE HISTORIAN(S):  None    Sara Jovel is a 28 y.o. female who presents to the ED complaining of ankle pain onset prior to arrival.  The patient reports that she was wearing high heals and was attempting to pick something up when she twisted her ankle on a rug. She states that she has injured the ankle before, and has broken both of her ankles though it was no as bad as her current injury. She describes her current pain as 10/10 in severity. No alleviating or exacerbating factors noted. No known drug allergies.    PAST MEDICAL HISTORY  Past Medical History:   Diagnosis Date    Anxiety     Migraine     Spina bifida (HCC)      SURGICAL HISTORY  Past Surgical History:   Procedure Laterality Date    APPENDECTOMY      TONSILLECTOMY       FAMILY HISTORY  History reviewed. No pertinent family history.    SOCIAL HISTORY   reports that she has never smoked. She has never used smokeless tobacco. She reports that she does not currently use alcohol. She reports that she does not currently use drugs after having used the following drugs: Inhaled.    CURRENT MEDICATIONS  Previous Medications    ACETAMINOPHEN (TYLENOL) 500 MG TAB    Take 1-2 Tablets by mouth every 6 hours as needed for Mild Pain or Moderate Pain.    AMITRIPTYLINE (ELAVIL) 10 MG TAB    Take 10 mg by mouth every evening.    CYCLOBENZAPRINE (FLEXERIL) 5 MG TABLET    Take 1-2 Tablets by mouth 3 times a day as needed for Muscle Spasms or Mild Pain.    CYCLOBENZAPRINE (FLEXERIL) 5 MG TABLET    Take 1-2 Tablets by mouth 3 times a day as needed  "for Muscle Spasms.    SUMATRIPTAN (IMITREX) 25 MG TAB TABLET    Take  mg by mouth one time as needed for Migraine.    TRAZODONE (DESYREL) 100 MG TAB    Take 100 mg by mouth every evening.     ALLERGIES  Shellfish allergy    PHYSICAL EXAM  BP (!) 149/100   Pulse (!) 116   Temp 37.1 °C (98.7 °F) (Temporal)   Resp 16   Ht 1.702 m (5' 7\")   Wt 90.7 kg (200 lb)   SpO2 98%   BMI 31.32 kg/m²   Constitutional: Well developed, Well nourished, Mild distress.   HENT: Normocephalic, Atraumatic, Class 1 Mallampati.  Eyes: Conjunctiva normal, No discharge.   Cardiovascular: Normal heart rate, Normal rhythm, No murmurs, equal pulses.   Pulmonary: Normal breath sounds, No respiratory distress, No wheezing, No rales, No rhonchi.  Musculoskeletal:  FROM in knee and hip. No tenderness in proximal tibia. Normal capillary refill in both feet. 2+ DP pulses. Tenderness along posterior malleolus. Significant swelling in right ankle.  Patient also has pain and tenderness along the metatarsals of the right foot.  No obvious deformity.  Skin: Warm, Dry, No erythema, No rash.   Neurologic: Alert & oriented x 3, Normal motor function,  No focal deficits noted.   Psychiatric: Affect normal, Judgment normal, Mood normal.     DIAGNOSTIC STUDIES    Radiology:   The attending emergency physician has independently interpreted the diagnostic imaging associated with this visit and am waiting the final reading from the radiologist.   Preliminary interpretation is a follows: Fracture of the right malleolus.  Radiologist interpretation:   DX-FOOT-COMPLETE 3+ RIGHT   Final Result         1.  Lateral malleolus fracture      DX-ANKLE 3+ VIEWS RIGHT   Final Result         1.  Lateral malleolus fracture           COURSE & MEDICAL DECISION MAKING     ED Observation Status? No; Patient does not meet criteria for ED Observation.     INITIAL ASSESSMENT, COURSE AND PLAN  Care Narrative:     8:34 PM - Patient was seen and evaluated at bedside. Patient " presents to the ED for left sided ankle pain.  After my exam, I discussed with the patient the plan of care, which includes obtaining imaging for further evaluation. Patient understands and verbalizes agreement to plan of care. Ordered DX-Foot-Complete 3+ Right and DX-Ankle 3+ Views Right to evaluate. Treated with Zofran 5 mg injection and Morphine 4 mg injection.    9:28 PM - Patient was reevaluated at bedside. Discussed radiology results with the patient and informed them that it is likely that she would need surgery for her lateral malleolus fracture, although it is not displaced.     10:41 PM - Patient was reevaluated at bedside. I reevaluated the patient at bedside.  Patient is neurovascular intact after splint application by ER tech.  The patient informs me they feel improved following medication administration.I discussed plan for discharge and follow up as outlined below. The patient is stable for discharge at this time and will return for any new or worsening symptoms. Patient verbalizes understanding and support with my plan for discharge.     HTN/IDDM FOLLOW UP:  The patient is referred to a primary physician for blood pressure management, diabetic screening, and for all other preventive health concerns   PROBLEM LIST  Problem #1 right ankle pain at this point time this appears to be secondary to Oneill 2 fibular fracture.  Will place her in a splint and make her nonweightbearing.  Will have her follow-up with orthopedics as she may need surgery.    DISPOSITION AND DISCUSSIONS  I have discussed management of the patient with the following physicians and KENA's:  None    Discussion of management with other QHP or appropriate source(s): None     Escalation of care considered, and ultimately not performed: blood analysis.  Do not think blood work is necessary  Barriers to care at this time, including but not limited to: Patient does not have established PCP.     Decision tools and prescription drugs considered  including, but not limited to: Pain Medications morphine .    I reviewed prescription monitoring program for patient's narcotic use before prescribing a scheduled drug.The patient will not drink alcohol nor drive with prescribed medications. The patient will return for new or worsening symptoms and is stable at the time of discharge.    The patient is referred to a primary physician for blood pressure management, diabetic screening, and for all other preventative health concerns.    In prescribing controlled substances to this patient, I certify that I have obtained and reviewed the medical history of Sara Jovel. I have also made a good gayla effort to obtain applicable records from other providers who have treated the patient and records did not demonstrate any increased risk of substance abuse that would prevent me from prescribing controlled substances.     I have conducted a physical exam and documented it. I have reviewed Ms. Jovel’s prescription history as maintained by the [place] Prescription Monitoring Program.     I have assessed the patient’s risk for abuse, dependency, and addiction using the validated Opioid Risk Tool available at https://www.mdcalc.com/haokvn-buza-eala-ort-narcotic-abuse.     Given the above, I believe the benefits of controlled substance therapy outweigh the risks. The reasons for prescribing controlled substances include non-narcotic, oral analgesic alternatives have been inadequate for pain control. Accordingly, I have discussed the risk and benefits, treatment plan, and alternative therapies with the patient.     DISPOSITION:  Patient will be discharged home in stable condition.    FOLLOW UP:  Reggie Flowers M.D.  [address] [place] [address]  [phone]    Schedule an appointment as soon as possible for a visit in 1 week      OUTPATIENT MEDICATIONS:  New Prescriptions    MORPHINE (MS IR) 15 MG TABLET    Take 0.5 Tablets by mouth every 6 hours as needed for  Severe Pain for up to 7 days.     FINAL DIANGOSIS  1. Closed fracture of distal end of right fibula, unspecified fracture morphology, initial encounter       IAustin (Scribe), am scribing for, and in the presence of, YASMANY Burr*.    Electronically signed by: Austin Maurer (Scribe), 3/17/2024    Yovani KELLY M.* personally performed the services described in this documentation, as scribed by Austin Maurer in my presence, and it is both accurate and complete.       The note accurately reflects work and decisions made by me.  Yovani Akbar M.D.  3/18/2024  3:22 AM

## 2024-03-20 PROBLEM — S82.899A ANKLE FRACTURE: Status: ACTIVE | Noted: 2024-03-20

## 2024-03-21 PROBLEM — S82.891A CLOSED FRACTURE OF RIGHT ANKLE: Status: ACTIVE | Noted: 2024-03-20

## 2025-06-20 ENCOUNTER — HOSPITAL ENCOUNTER (EMERGENCY)
Facility: MEDICAL CENTER | Age: 30
End: 2025-06-20
Attending: EMERGENCY MEDICINE
Payer: MEDICAID

## 2025-06-20 VITALS
SYSTOLIC BLOOD PRESSURE: 144 MMHG | TEMPERATURE: 96.9 F | DIASTOLIC BLOOD PRESSURE: 98 MMHG | RESPIRATION RATE: 14 BRPM | HEART RATE: 80 BPM | HEIGHT: 67 IN | WEIGHT: 196.65 LBS | OXYGEN SATURATION: 96 % | BODY MASS INDEX: 30.87 KG/M2

## 2025-06-20 DIAGNOSIS — Z34.90 PREGNANCY, UNSPECIFIED GESTATIONAL AGE: ICD-10-CM

## 2025-06-20 DIAGNOSIS — K08.89 DENTALGIA: Primary | ICD-10-CM

## 2025-06-20 PROCEDURE — 99282 EMERGENCY DEPT VISIT SF MDM: CPT

## 2025-06-20 ASSESSMENT — PAIN DESCRIPTION - PAIN TYPE: TYPE: ACUTE PAIN

## 2025-06-20 NOTE — ED TRIAGE NOTES
Chief Complaint   Patient presents with    Tooth Abscess     Pt reports tooth with filling chipped to L upper tooth, reports worsening pain radiating to entire left side of face/head     Pt ambulatory to triage for above complaint. Pt was seen at OB today to schedule induction date for 6/28 and is concerned for infection/abscess and needing treatment prior to.     Pt is alert & oriented and follows commands. Pt speaking in full sentences and responds appropriately to questions. No acute distress noted in triage. Respirations are even and unlabored. Skin is pink/warm/dry.    Pt placed back in lobby and educated on triage process. Pt encouraged to alert staff to any changes in condition.

## 2025-06-20 NOTE — DISCHARGE INSTRUCTIONS
Start taking the amoxicillin as prescribed by her obstetrician.  Follow-up with a dentist for further outpatient treatment and care.

## 2025-06-20 NOTE — ED NOTES
Pt ambulated to Ortho 01 with steady gait. Patient 38 weeks pregnant, Charge RN notified. Pt placed on monitors. Chart up for ERP.

## 2025-06-20 NOTE — ED PROVIDER NOTES
"CHIEF COMPLAINT  Chief Complaint   Patient presents with    Tooth Abscess     Pt reports tooth with filling chipped to L upper tooth, reports worsening pain radiating to entire left side of face/head       LIMITATION TO HISTORY   Select: none    HPI    Sara Jovel is a 29 y.o. female who presents to the Emergency Department for a tooth abscess onset about a week ago. The patient reports that she had her upper molar filled, however the filling had cracked and fallen out. She notes that she had tried to leave it alone while attempting to see a dentist, however a lot of them do not take her insurance. She reports that her pain is moderate and has left sided facial swelling. The patient states that her OBGYN is concerned about an abscess and prescribed the patient Amoxicillin 5 day course just prior to arrival. She has not taken any yet. The patient is currently 38 weeks pregnant and is scheduled for an induction on the 28 th, which is also why her OBGYN is concerned. The patient has tried using Tylenol and salt water with no alleviation. She notes that brushing her teeth would cause her more pain.      OUTSIDE HISTORIAN(S):  Select: None    EXTERNAL RECORDS REVIEWED  Select: The patient has no recent pertinent records to review.     PAST MEDICAL HISTORY  Past Medical History[1]    SURGICAL HISTORY  Past Surgical History[2]    FAMILY HISTORY  No family history noted.     SOCIAL HISTORY  Social History[3]    CURRENT MEDICATIONS  Medications Ordered Prior to Encounter[4]    ALLERGIES  Allergies[5]    PHYSICAL EXAM  VITAL SIGNS:/88   Pulse (!) 104   Temp 36.2 °C (97.2 °F) (Temporal)   Resp 16   Ht 1.702 m (5' 7\")   Wt 89.2 kg (196 lb 10.4 oz)   SpO2 97%   BMI 30.80 kg/m²       Constitutional: Well-developed no acute distress   HENT: Normocephalic, Atraumatic, Left second molar has a small chip in it, Tenderness to touch, Gingiva appears normal, No signs of swelling or erythema, Bilateral external ears " normal.  Eyes:  conjunctiva are normal.   Neck: Supple.  Non tender midline  Abdomen: Soft, non distended, non tender, Gravid.   Skin: Warm, Dry, No erythema,   Back: No tenderness, No CVA tenderness.  Musculoskeletal: No clubbing cyanosis or edema good range of motion       COURSE & MEDICAL DECISION MAKING    ED COURSE:    ED Observation Status? No, The patient does not qualify for observation status     INTERVENTIONS BY ME:    1:11 PM - Patient seen and examined at bedside. I discussed plan for discharge and follow up as outlined below. I placed a referral to dentistry. The patient is stable for discharge at this time and will return for any new or worsening symptoms. Patient verbalizes understanding and support with my plan for discharge.      INITIAL ASSESSMENT, COURSE AND PLAN  Care Narrative: Patient presents to the emergency department for evaluation of her dental pain.  There is no erythema and swelling to the area, no overt signs of abscess at this time.  No signs of Román's angina.  At this point the patient had already been given a prescription of antibiotics.  The patient is a follow-up with a dentist for further outpatient treatment care return if any symptoms worsen.        ADDITIONAL PROBLEM LIST  None    DISPOSITION AND DISCUSSIONS  I have discussed management of the patient with the following physicians and KENA's: None    Escalation of care considered, and ultimately not performed: None    Barriers to care at this time, including but not limited to: Patient does not have established PCP.     Decision tools and prescription drugs considered including, but not limited to: As described above.       The patient will return for new or worsening symptoms and is stable at the time of discharge.    The patient is referred to a primary physician for blood pressure management, diabetic screening, and for all other preventative health concerns.    DISPOSITION:  Patient will be discharged home in stable  condition.    FOLLOW UP:  Dental Referral Sheet    Schedule an appointment as soon as possible for a visit   with a Dentist for further care      FINAL DIAGNOSIS  1. Dentalgia    2. Pregnancy, unspecified gestational age         Lucy KELLY), am scribing for, and in the presence of, Jeffrey Gan M.D..    Electronically signed by: Lucy Holcomb), 6/20/2025    Jeffrey KELLY M.D. personally performed the services described in this documentation, as scribed by Lucy Gan in my presence, and it is both accurate and complete.     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     Current Outpatient Medications on File Prior to Encounter   Medication Sig Dispense Refill    sulfamethoxazole-trimethoprim (BACTRIM DS) 800-160 MG tablet Take 1 Tablet by mouth 2 times a day. 14 Tablet 0    traZODone (DESYREL) 100 MG Tab Take 100 mg by mouth every evening.      cyclobenzaprine (FLEXERIL) 5 mg tablet Take 1-2 Tablets by mouth 3 times a day as needed for Muscle Spasms. 20 Tablet 0    cyclobenzaprine (FLEXERIL) 5 mg tablet Take 1-2 Tablets by mouth 3 times a day as needed for Muscle Spasms or Mild Pain. 30 Tablet 0    acetaminophen (TYLENOL) 500 MG Tab Take 1-2 Tablets by mouth every 6 hours as needed for Mild Pain or Moderate Pain. 30 Tablet 0    SUMAtriptan (IMITREX) 25 MG Tab tablet Take  mg by mouth one time as needed for Migraine.       amitriptyline (ELAVIL) 10 MG Tab Take 10 mg by mouth every evening.     [5]   Allergies  Allergen Reactions    Compazine      Crawling out of skin    Shellfish Allergy Anaphylaxis